# Patient Record
Sex: MALE | Race: WHITE | NOT HISPANIC OR LATINO | Employment: FULL TIME | URBAN - METROPOLITAN AREA
[De-identification: names, ages, dates, MRNs, and addresses within clinical notes are randomized per-mention and may not be internally consistent; named-entity substitution may affect disease eponyms.]

---

## 2023-04-05 ENCOUNTER — HOSPITAL ENCOUNTER (OUTPATIENT)
Dept: DATA CONVERSION | Facility: HOSPITAL | Age: 63
End: 2023-04-05
Attending: PHYSICAL MEDICINE & REHABILITATION | Admitting: PHYSICAL MEDICINE & REHABILITATION
Payer: COMMERCIAL

## 2023-04-05 DIAGNOSIS — M46.1 SACROILIITIS, NOT ELSEWHERE CLASSIFIED (CMS-HCC): ICD-10-CM

## 2023-04-19 ENCOUNTER — HOSPITAL ENCOUNTER (OUTPATIENT)
Dept: DATA CONVERSION | Facility: HOSPITAL | Age: 63
End: 2023-04-19
Attending: PHYSICAL MEDICINE & REHABILITATION | Admitting: PHYSICAL MEDICINE & REHABILITATION
Payer: COMMERCIAL

## 2023-04-19 DIAGNOSIS — M46.1 SACROILIITIS, NOT ELSEWHERE CLASSIFIED (CMS-HCC): ICD-10-CM

## 2023-09-07 VITALS — BODY MASS INDEX: 33.19 KG/M2 | WEIGHT: 250.44 LBS | HEIGHT: 73 IN

## 2023-09-14 NOTE — H&P
History & Physical Reviewed:   I have reviewed the History and Physical dated:  05-Apr-2023   History and Physical reviewed and relevant findings noted. Patient examined to review pertinent physical  findings.: No significant changes   Home Medications Reviewed: no changes noted   Allergies Reviewed: no changes noted       ERAS (Enhanced Recovery After Surgery):  ·  ERAS Patient: no     Consent:   COVID-19 Consent:  ·  COVID-19 Risk Consent Surgeon has reviewed key risks related to the risk of niraj COVID-19 and if they contract COVID-19 what the risks are.       Electronic Signatures:  Alex Roberts)  (Signed 05-Apr-2023 07:48)   Authored: History & Physical Reviewed, ERAS, Consent,  Note Completion      Last Updated: 05-Apr-2023 07:48 by Alex Roberts)

## 2023-09-14 NOTE — H&P
History & Physical Reviewed:   I have reviewed the History and Physical dated:  19-Apr-2023   History and Physical reviewed and relevant findings noted. Patient examined to review pertinent physical  findings.: No significant changes   Home Medications Reviewed: no changes noted   Allergies Reviewed: no changes noted       ERAS (Enhanced Recovery After Surgery):  ·  ERAS Patient: no     Consent:   COVID-19 Consent:  ·  COVID-19 Risk Consent Surgeon has reviewed key risks related to the risk of niraj COVID-19 and if they contract COVID-19 what the risks are.       Electronic Signatures:  Alex Roberts)  (Signed 19-Apr-2023 07:59)   Authored: History & Physical Reviewed, ERAS, Consent,  Note Completion      Last Updated: 19-Apr-2023 07:59 by Alex Roberts)

## 2023-09-27 ENCOUNTER — HOSPITAL ENCOUNTER (OUTPATIENT)
Dept: DATA CONVERSION | Facility: HOSPITAL | Age: 63
End: 2023-09-27
Attending: PHYSICAL MEDICINE & REHABILITATION | Admitting: PHYSICAL MEDICINE & REHABILITATION
Payer: COMMERCIAL

## 2023-09-27 DIAGNOSIS — M46.1 SACROILIITIS, NOT ELSEWHERE CLASSIFIED (CMS-HCC): ICD-10-CM

## 2023-09-29 VITALS — BODY MASS INDEX: 33.19 KG/M2 | HEIGHT: 73 IN | WEIGHT: 250.44 LBS

## 2023-09-30 NOTE — H&P
History & Physical Reviewed:   I have reviewed the History and Physical dated:  27-Sep-2023   History and Physical reviewed and relevant findings noted. Patient examined to review pertinent physical  findings.: No significant changes   Home Medications Reviewed: no changes noted   Allergies Reviewed: no changes noted       ERAS (Enhanced Recovery After Surgery):  ·  ERAS Patient: no     Consent:   COVID-19 Consent:  ·  COVID-19 Risk Consent Surgeon has reviewed key risks related to the risk of niraj COVID-19 and if they contract COVID-19 what the risks are.       Electronic Signatures:  Alex Roberts)  (Signed 27-Sep-2023 08:38)   Authored: History & Physical Reviewed, ERAS, Consent,  Note Completion      Last Updated: 27-Sep-2023 08:38 by Alex Roberts)

## 2023-10-04 DIAGNOSIS — M46.1 SACROILIAC INFLAMMATION (CMS-HCC): Primary | ICD-10-CM

## 2023-10-17 PROBLEM — S33.30XA: Status: ACTIVE | Noted: 2023-10-17

## 2023-10-17 PROBLEM — M43.27: Status: ACTIVE | Noted: 2023-10-17

## 2023-10-17 PROBLEM — N52.2 DRUG-INDUCED ERECTILE DYSFUNCTION: Status: ACTIVE | Noted: 2023-10-17

## 2023-10-17 PROBLEM — F41.9 ANXIETY: Status: ACTIVE | Noted: 2023-10-17

## 2023-10-17 PROBLEM — S31.000A: Status: ACTIVE | Noted: 2023-10-17

## 2023-10-17 PROBLEM — S46.019A: Status: ACTIVE | Noted: 2023-10-17

## 2023-10-17 PROBLEM — G47.00 INSOMNIA: Status: ACTIVE | Noted: 2023-10-17

## 2023-10-17 PROBLEM — N52.9 ERECTILE DYSFUNCTION: Status: ACTIVE | Noted: 2023-10-17

## 2023-10-17 PROBLEM — M46.1 BILATERAL SACROILIITIS (CMS-HCC): Status: ACTIVE | Noted: 2023-10-17

## 2023-10-17 PROBLEM — S32.10XA CLOSED FRACTURE OF SACRUM (MULTI): Status: ACTIVE | Noted: 2023-10-17

## 2023-10-17 PROBLEM — S22.49XA CLOSED FRACTURE OF FOUR RIBS: Status: ACTIVE | Noted: 2023-10-17

## 2023-10-17 PROBLEM — K59.03 DRUG-INDUCED CONSTIPATION: Status: ACTIVE | Noted: 2023-10-17

## 2023-10-17 RX ORDER — NALOXONE HYDROCHLORIDE 4 MG/.1ML
4 SPRAY NASAL AS NEEDED
COMMUNITY
Start: 2023-02-08

## 2023-10-17 RX ORDER — NABUMETONE 500 MG/1
500 TABLET, FILM COATED ORAL 2 TIMES DAILY
COMMUNITY
End: 2023-10-26 | Stop reason: SDUPTHER

## 2023-10-17 RX ORDER — PEN NEEDLE, DIABETIC 29 G X1/2"
NEEDLE, DISPOSABLE MISCELLANEOUS
COMMUNITY
Start: 2023-01-17

## 2023-10-17 RX ORDER — HYDROCODONE BITARTRATE AND ACETAMINOPHEN 5; 325 MG/1; MG/1
1 TABLET ORAL EVERY 4 HOURS PRN
COMMUNITY
Start: 2015-10-07 | End: 2023-10-26 | Stop reason: SDUPTHER

## 2023-10-17 RX ORDER — TRAZODONE HYDROCHLORIDE 50 MG/1
1 TABLET ORAL NIGHTLY
COMMUNITY
Start: 2022-05-13 | End: 2023-10-26 | Stop reason: SDUPTHER

## 2023-10-17 RX ORDER — PEN NEEDLE, DIABETIC 32 GX 1/4"
NEEDLE, DISPOSABLE MISCELLANEOUS
COMMUNITY
Start: 2023-08-18

## 2023-10-17 RX ORDER — VARDENAFIL HYDROCHLORIDE 20 MG/1
20 TABLET ORAL DAILY PRN
COMMUNITY
Start: 2019-05-17

## 2023-10-17 RX ORDER — LUBIPROSTONE 24 UG/1
24 CAPSULE, GELATIN COATED ORAL 2 TIMES DAILY
COMMUNITY
Start: 2015-10-07 | End: 2023-10-26 | Stop reason: SDUPTHER

## 2023-10-17 RX ORDER — INSULIN GLARGINE 100 [IU]/ML
INJECTION, SOLUTION SUBCUTANEOUS
COMMUNITY
Start: 2023-05-19

## 2023-10-17 RX ORDER — METFORMIN HYDROCHLORIDE 1000 MG/1
TABLET ORAL
COMMUNITY
Start: 2023-08-18

## 2023-10-17 RX ORDER — ROSUVASTATIN CALCIUM 20 MG/1
TABLET, COATED ORAL
COMMUNITY
Start: 2023-08-10

## 2023-10-17 RX ORDER — HYDROMORPHONE HYDROCHLORIDE 12 MG/1
1 TABLET, EXTENDED RELEASE ORAL DAILY
COMMUNITY
End: 2023-10-26 | Stop reason: SDUPTHER

## 2023-10-17 RX ORDER — FLASH GLUCOSE SENSOR
KIT MISCELLANEOUS
COMMUNITY
Start: 2023-06-19

## 2023-10-18 ENCOUNTER — ANCILLARY PROCEDURE (OUTPATIENT)
Dept: RADIOLOGY | Facility: CLINIC | Age: 63
End: 2023-10-18
Payer: COMMERCIAL

## 2023-10-18 ENCOUNTER — HOSPITAL ENCOUNTER (OUTPATIENT)
Dept: OPERATING ROOM | Facility: CLINIC | Age: 63
Discharge: HOME | End: 2023-10-18
Payer: COMMERCIAL

## 2023-10-18 VITALS
TEMPERATURE: 97.2 F | BODY MASS INDEX: 34.77 KG/M2 | RESPIRATION RATE: 16 BRPM | HEART RATE: 71 BPM | HEIGHT: 73 IN | DIASTOLIC BLOOD PRESSURE: 71 MMHG | SYSTOLIC BLOOD PRESSURE: 148 MMHG | WEIGHT: 262.35 LBS | OXYGEN SATURATION: 94 %

## 2023-10-18 DIAGNOSIS — M46.1 BILATERAL SACROILIITIS (CMS-HCC): Primary | ICD-10-CM

## 2023-10-18 LAB — GLUCOSE BLD MANUAL STRIP-MCNC: 178 MG/DL (ref 74–99)

## 2023-10-18 PROCEDURE — 96372 THER/PROPH/DIAG INJ SC/IM: CPT | Performed by: PHYSICAL MEDICINE & REHABILITATION

## 2023-10-18 PROCEDURE — 2500000005 HC RX 250 GENERAL PHARMACY W/O HCPCS: Performed by: PHYSICAL MEDICINE & REHABILITATION

## 2023-10-18 PROCEDURE — 2550000001 HC RX 255 CONTRASTS: Performed by: PHYSICAL MEDICINE & REHABILITATION

## 2023-10-18 PROCEDURE — 7100000010 HC PHASE TWO TIME - EACH INCREMENTAL 1 MINUTE

## 2023-10-18 PROCEDURE — 82947 ASSAY GLUCOSE BLOOD QUANT: CPT

## 2023-10-18 PROCEDURE — 7100000009 HC PHASE TWO TIME - INITIAL BASE CHARGE

## 2023-10-18 PROCEDURE — 2500000004 HC RX 250 GENERAL PHARMACY W/ HCPCS (ALT 636 FOR OP/ED): Performed by: PHYSICAL MEDICINE & REHABILITATION

## 2023-10-18 PROCEDURE — 77003 FLUOROGUIDE FOR SPINE INJECT: CPT

## 2023-10-18 PROCEDURE — 27096 INJECT SACROILIAC JOINT: CPT | Performed by: PHYSICAL MEDICINE & REHABILITATION

## 2023-10-18 RX ORDER — LIDOCAINE HYDROCHLORIDE 5 MG/ML
INJECTION, SOLUTION INFILTRATION; PERINEURAL AS NEEDED
Status: COMPLETED | OUTPATIENT
Start: 2023-10-18 | End: 2023-10-18

## 2023-10-18 RX ORDER — DEXAMETHASONE SODIUM PHOSPHATE 100 MG/10ML
INJECTION INTRAMUSCULAR; INTRAVENOUS AS NEEDED
Status: COMPLETED | OUTPATIENT
Start: 2023-10-18 | End: 2023-10-18

## 2023-10-18 RX ORDER — FENTANYL CITRATE 50 UG/ML
INJECTION, SOLUTION INTRAMUSCULAR; INTRAVENOUS AS NEEDED
Status: COMPLETED | OUTPATIENT
Start: 2023-10-18 | End: 2023-10-18

## 2023-10-18 RX ORDER — LIDOCAINE HYDROCHLORIDE 5 MG/ML
INJECTION, SOLUTION INFILTRATION; PERINEURAL AS NEEDED
Status: DISCONTINUED | OUTPATIENT
Start: 2023-10-18 | End: 2023-10-20 | Stop reason: HOSPADM

## 2023-10-18 RX ADMIN — LIDOCAINE HYDROCHLORIDE 5 ML: 5 INJECTION, SOLUTION INFILTRATION; PERINEURAL at 08:33

## 2023-10-18 RX ADMIN — FENTANYL CITRATE 100 MCG: 50 INJECTION, SOLUTION INTRAMUSCULAR; INTRAVENOUS at 09:14

## 2023-10-18 RX ADMIN — DEXAMETHASONE SODIUM PHOSPHATE 10 MG: 10 INJECTION INTRAMUSCULAR; INTRAVENOUS at 09:17

## 2023-10-18 RX ADMIN — LIDOCAINE HYDROCHLORIDE 5 ML: 5 INJECTION, SOLUTION INFILTRATION; PERINEURAL at 09:17

## 2023-10-18 RX ADMIN — IOHEXOL 2 ML: 240 INJECTION, SOLUTION INTRATHECAL; INTRAVASCULAR; INTRAVENOUS; ORAL at 09:17

## 2023-10-18 NOTE — OP NOTE
* No procedures listed * Operative Note     Date: 10/18/2023  [place] [facility] [place] [id number] [place]    Name: Jared Thomson, : 1960, Age: 63 y.o., MRN: [id number], Sex: male    Diagnosis  Pre-op Diagnosis     * Sacroiliac inflammation (CMS/HCC) [M46.1] Same     Time-in: 9:00 am   time-out: 9:18 am     Sedation: 100 mcg of IV fentanyl     Side:  bilateral    Indication: Failure to respond to conservative treatment with therapy and medications.     PROCEDURE:    The risks, benefits and alternatives of the procedure were discussed with the patient and agreed to proceed. The risks included but not limited to: infection, bleeding, paralysis, nerve injury sepsis and remotely death were discussed with the patient during the office and again in the pre procedure area.  The patient signed informed consent in the pre procedure area.     The patient was brought to procedure room and time out for the procedure was performed with the procedure room staff present.    Patient placed in prone position on the procedure table and draped and covered appropriately.      Fluoroscopy machine was used to identify the lumbo sacral area over the  right SIJ    Skin was prepped and draped in sterile fashion over the SIJ area.  Skin was infiltrated with local anesthetic with lidocaien 0.5%    Spinal needle was to the lower end of the SIJ, tip of the needle position was verified with adequate flow of contrast dye 0.5cc of Isovue in the joint space.  At that point, 10 mg dexamethasone mixed with 1 mL of lidocaine 0.5% were injected.      Same procedure repeated on the left SIJ with 10 mg of Dexamethasone mixed with 1 cc of lidocaine 0.5 % in the left SIJ      Procedure tolerated very well.  No complications encountered.  Post procedure care discussed with the patient, agreed to proceed.   Patient instructed on keeping track of the pain level after discharge.   Patient discharged home, from the recovery room, in stable  condition.    Attending Attestation: I performed the procedure.    Alex Roberts MD

## 2023-10-18 NOTE — H&P
Mr. Thomson is back today for the injection.  He continues to have the pain in the sacroiliac joint on the left side.  This is a plan to do the procedure.  There is no change in his history and physical examination since the last office visit last month

## 2023-10-18 NOTE — INTERVAL H&P NOTE
H&P reviewed. The patient was examined and there are no changes to the H&P.  Denied any fever or chills. No weight loss and no night sweats. No cough or sputum production. No diarrhea   The constipation has been responding to fibers and over the counter medications.     No bladder and bowel incontinence and no other changes in bladder and bowel. No skin changes.  Reports tiredness and fatigability only if the pain is not controlled.   Denied opioids diversion and abuse and denies alcoholism. Denies overuse of the pain medications.

## 2023-10-26 ENCOUNTER — OFFICE VISIT (OUTPATIENT)
Dept: PAIN MEDICINE | Facility: CLINIC | Age: 63
End: 2023-10-26
Payer: COMMERCIAL

## 2023-10-26 DIAGNOSIS — S22.41XA CLOSED FRACTURE OF FOUR RIBS OF RIGHT SIDE, INITIAL ENCOUNTER: ICD-10-CM

## 2023-10-26 DIAGNOSIS — M43.27 ANKYLOSIS OF LUMBOSACRAL JOINT: ICD-10-CM

## 2023-10-26 DIAGNOSIS — M46.1 BILATERAL SACROILIITIS (CMS-HCC): Primary | ICD-10-CM

## 2023-10-26 DIAGNOSIS — S32.10XA CLOSED FRACTURE OF SACRUM, UNSPECIFIED PORTION OF SACRUM, INITIAL ENCOUNTER (MULTI): ICD-10-CM

## 2023-10-26 DIAGNOSIS — S46.011A RUPTURE OF RIGHT SUPRASPINATUS TENDON, INITIAL ENCOUNTER: ICD-10-CM

## 2023-10-26 PROCEDURE — 99214 OFFICE O/P EST MOD 30 MIN: CPT | Performed by: PHYSICAL MEDICINE & REHABILITATION

## 2023-10-26 RX ORDER — HYDROMORPHONE HYDROCHLORIDE 12 MG/1
1 TABLET, EXTENDED RELEASE ORAL DAILY
Qty: 28 TABLET | Refills: 0 | Status: SHIPPED | OUTPATIENT
Start: 2023-11-29 | End: 2023-12-21 | Stop reason: SDUPTHER

## 2023-10-26 RX ORDER — HYDROMORPHONE HYDROCHLORIDE 12 MG/1
1 TABLET, EXTENDED RELEASE ORAL DAILY
Qty: 28 TABLET | Refills: 0 | Status: SHIPPED | OUTPATIENT
Start: 2023-11-01 | End: 2023-11-29

## 2023-10-26 RX ORDER — HYDROCODONE BITARTRATE AND ACETAMINOPHEN 5; 325 MG/1; MG/1
1 TABLET ORAL EVERY 8 HOURS PRN
Qty: 84 TABLET | Refills: 0 | Status: SHIPPED | OUTPATIENT
Start: 2023-11-29 | End: 2023-10-26 | Stop reason: SDUPTHER

## 2023-10-26 RX ORDER — HYDROCODONE BITARTRATE AND ACETAMINOPHEN 5; 325 MG/1; MG/1
1 TABLET ORAL EVERY 8 HOURS PRN
Qty: 84 TABLET | Refills: 0 | Status: SHIPPED | OUTPATIENT
Start: 2023-11-29 | End: 2023-12-21 | Stop reason: SDUPTHER

## 2023-10-26 RX ORDER — TRAZODONE HYDROCHLORIDE 50 MG/1
50 TABLET ORAL NIGHTLY
Qty: 28 TABLET | Refills: 1 | Status: SHIPPED | OUTPATIENT
Start: 2023-10-26 | End: 2023-12-21 | Stop reason: SDUPTHER

## 2023-10-26 RX ORDER — HYDROMORPHONE HYDROCHLORIDE 12 MG/1
1 TABLET, EXTENDED RELEASE ORAL DAILY
Qty: 28 TABLET | Refills: 0 | Status: SHIPPED | OUTPATIENT
Start: 2023-11-29 | End: 2023-10-26 | Stop reason: SDUPTHER

## 2023-10-26 RX ORDER — LUBIPROSTONE 24 UG/1
24 CAPSULE, GELATIN COATED ORAL 2 TIMES DAILY
Qty: 56 CAPSULE | Refills: 1 | Status: SHIPPED | OUTPATIENT
Start: 2023-10-26 | End: 2023-12-21 | Stop reason: SDUPTHER

## 2023-10-26 RX ORDER — NABUMETONE 500 MG/1
500 TABLET, FILM COATED ORAL 2 TIMES DAILY
Qty: 56 TABLET | Refills: 1 | Status: SHIPPED | OUTPATIENT
Start: 2023-10-26 | End: 2023-11-23

## 2023-10-26 RX ORDER — HYDROMORPHONE HYDROCHLORIDE 12 MG/1
1 TABLET, EXTENDED RELEASE ORAL DAILY
Qty: 28 TABLET | Refills: 0 | Status: SHIPPED | OUTPATIENT
Start: 2023-11-01 | End: 2023-10-26 | Stop reason: SDUPTHER

## 2023-10-26 RX ORDER — HYDROCODONE BITARTRATE AND ACETAMINOPHEN 5; 325 MG/1; MG/1
1 TABLET ORAL EVERY 8 HOURS PRN
Qty: 84 TABLET | Refills: 0 | Status: SHIPPED | OUTPATIENT
Start: 2023-11-01 | End: 2023-11-29

## 2023-10-26 RX ORDER — HYDROCODONE BITARTRATE AND ACETAMINOPHEN 5; 325 MG/1; MG/1
1 TABLET ORAL EVERY 8 HOURS PRN
Qty: 84 TABLET | Refills: 0 | Status: SHIPPED | OUTPATIENT
Start: 2023-11-01 | End: 2023-10-26 | Stop reason: SDUPTHER

## 2023-10-26 RX ORDER — TRAZODONE HYDROCHLORIDE 50 MG/1
50 TABLET ORAL NIGHTLY
Qty: 28 TABLET | Refills: 1 | Status: SHIPPED | OUTPATIENT
Start: 2023-10-26 | End: 2023-10-26 | Stop reason: SDUPTHER

## 2023-10-26 RX ORDER — LUBIPROSTONE 24 UG/1
24 CAPSULE, GELATIN COATED ORAL 2 TIMES DAILY
Qty: 56 CAPSULE | Refills: 1 | Status: SHIPPED | OUTPATIENT
Start: 2023-10-26 | End: 2023-10-26 | Stop reason: SDUPTHER

## 2023-10-26 RX ORDER — NABUMETONE 500 MG/1
500 TABLET, FILM COATED ORAL 2 TIMES DAILY
Qty: 56 TABLET | Refills: 1 | Status: SHIPPED | OUTPATIENT
Start: 2023-10-26 | End: 2023-10-26 | Stop reason: SDUPTHER

## 2023-10-28 NOTE — PROGRESS NOTES
[facility] [place] [id number]  DOI 1.18.2007     · Rupture of supraspinatus tendon (840.4) (S46.019A)   · Open traumatic subluxation of pelvis, initial encounter (839.79) (S33.30XA,S31.000A)   · Bilateral sacroiliitis (720.2) (M46.1)   · Ankylosis of lumbosacral joint (724.6) (M43.27)    Chief complaint  Back pain over the SIJ    History  Mr Thomson returned today for follow up office visit .  The SIJ injection helped with the pain control. The pain is better controlled with the pain meds at this time but it is not gone. the pain is on the either side of midline . and the pain is mechanical in characteristics , continuous and gets worse with movements mainly with forward flexion and bending. he has the symptoms bilaterally   he reported no radicular symptoms to the lower limbs       reported no bowel and bladder incontinence      he is using the pain medications and those are taking the edge of the pain.     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 Denied euphoria associated with the use of the pain medications.      opioids treatment agreement February 2023   Oarrs pulled and scanned in the chart ORS is 160 and Narx is 472   last urine toxicology testing September 2022 and April 2023 nd that was compatible.    Xray updated lumbar spine   ORT Score is 0  Pain pathology and pain generators lumbar spine and shoulder and sacro iliac joint   Modalities tried surgery Physical Therapy and acupuncture and home exercises program and intra articular steroid injection      Denied any fever or chills. No weight loss and no night sweats. No cough or sputum production. No diarrhea   The constipation has been responding to fibers and over the counter medications.     No bladder and bowel incontinence and no other changes in bladder and bowel. No skin  changes.  Reports tiredness and fatigability only if the pain is not controlled.   Denied opioids diversion and abuse and denies alcoholism. Denies overuse of the pain medications.      Physical examination  declined Chaperone for the visit and was appropriately draped for the exam.      Awake, Alert and oriented to time, place and persons. The speech is coherent and well understood.   no acute distress   The pupils are equal, midsize, reactive to light and accommodation.      widened perimeter of stance      Negative loading of the sacroiliac joint bilaterally with positive Jesse and Compression testing and distraction test. Gaenslen is positive on both side     he has negative straight leg raising bilaterally .      pain inhibition of the hips Manual Muscle strength testing because of the pain in the lower back . The endurance was decreased even though the initial resistance was 5/5 but could not keep beyond initial resistance.      limited range of motion of the lumbar spine because of the stiffness      nares are clear . no iv tracks . skin in intact in the feet. cap refill of 2 second and palpable pedal pulses        Diagnosis  Problem List Items Addressed This Visit       Ankylosis of lumbosacral joint    Relevant Medications    Amitiza 24 mcg capsule    HYDROcodone-acetaminophen (Norco) 5-325 mg tablet (Start on 11/29/2023)    HYDROcodone-acetaminophen (Norco) 5-325 mg tablet (Start on 11/1/2023)    HYDROmorphone 12 mg tablet extended release 24 hr (Start on 11/29/2023)    HYDROmorphone 12 mg tablet extended release 24 hr (Start on 11/1/2023)    nabumetone (Relafen) 500 mg tablet    traZODone (Desyrel) 50 mg tablet    Bilateral sacroiliitis (CMS/HCC) - Primary    Relevant Medications    Amitiza 24 mcg capsule    HYDROcodone-acetaminophen (Norco) 5-325 mg tablet (Start on 11/29/2023)    HYDROcodone-acetaminophen (Norco) 5-325 mg tablet (Start on 11/1/2023)    HYDROmorphone 12 mg tablet extended release 24 hr  (Start on 11/29/2023)    HYDROmorphone 12 mg tablet extended release 24 hr (Start on 11/1/2023)    nabumetone (Relafen) 500 mg tablet    traZODone (Desyrel) 50 mg tablet    Closed fracture of four ribs    Closed fracture of sacrum (CMS/HCC)    Rupture of supraspinatus tendon        Plan  reviewed the pain generators in the pelvis and sacro iliac joint and shoulder      Discussed the mechanism of action of the acupuncture and injection and Physical Therapy      Went over the pain medications and mechanism of action and side effects  Home exercises program      .renew pain medications with long acting formulation hydromorphone and short acting hydrocodone  he has the Opioids induced constipation (K59.03) and will try him on movantik since the amitiza is not helping now and the over the counter did not help      I recommended cutting back on the pain medications. I explained my rationale as before. Previous trials on cutting back on the medications led to increase pain. We are going to keep trying and encouraging a decrease on the dosing of the pain medications at every visit.     We discussed that we are prescribing the medications on good leo and legitimate medical reason.     The level of clinical decision making in this office visit,  is high, given the high risks of complications with the morbidity and mortality due to the fact that acute and chronic pain may pose a threat to life and bodily function, if under treated, poorly treated, or with failure to maintain adequate treatment and timely medical follow up. Additionally over treatment has its own set of complications including overdosing on the pain medications and also the habit forming potentials with the use of the medications used to treat chronic painful conditions including therapeutic classes classified as dangerous medications. Given the serious and fluctuating nature of pain level and instensity with extensive consideration for whenever pain changes,  there is always the risk of prolonged functional impairment requiring close patient monitoring with regular assessments and reassessments and high level medical decision making at every office visit. The amount and complexity of data reviewed is high given the patient clinical presentation, labs,  data, radiology reports, and other tests as discussed during office visits. Pertinent data whether positive or negative were taken in consideration in the process of making this high level medical decision.        Follow-up 8 weeks or earlier if needed

## 2023-12-21 ENCOUNTER — OFFICE VISIT (OUTPATIENT)
Dept: PAIN MEDICINE | Facility: CLINIC | Age: 63
End: 2023-12-21
Payer: COMMERCIAL

## 2023-12-21 DIAGNOSIS — M43.27 ANKYLOSIS OF LUMBOSACRAL JOINT: Primary | ICD-10-CM

## 2023-12-21 DIAGNOSIS — S31.000A OPEN TRAUMATIC SUBLUXATION OF PELVIS, INITIAL ENCOUNTER: ICD-10-CM

## 2023-12-21 DIAGNOSIS — S33.30XA OPEN TRAUMATIC SUBLUXATION OF PELVIS, INITIAL ENCOUNTER: ICD-10-CM

## 2023-12-21 DIAGNOSIS — M46.1 BILATERAL SACROILIITIS (CMS-HCC): ICD-10-CM

## 2023-12-21 DIAGNOSIS — S32.10XA CLOSED FRACTURE OF SACRUM, UNSPECIFIED PORTION OF SACRUM, INITIAL ENCOUNTER (MULTI): ICD-10-CM

## 2023-12-21 DIAGNOSIS — S46.011A RUPTURE OF RIGHT SUPRASPINATUS TENDON, INITIAL ENCOUNTER: ICD-10-CM

## 2023-12-21 PROCEDURE — 99214 OFFICE O/P EST MOD 30 MIN: CPT | Performed by: PHYSICAL MEDICINE & REHABILITATION

## 2023-12-21 RX ORDER — TRAZODONE HYDROCHLORIDE 50 MG/1
50 TABLET ORAL NIGHTLY
Qty: 28 TABLET | Refills: 0 | Status: SHIPPED | OUTPATIENT
Start: 2023-12-21 | End: 2024-01-23 | Stop reason: SDUPTHER

## 2023-12-21 RX ORDER — LUBIPROSTONE 24 UG/1
24 CAPSULE, GELATIN COATED ORAL 2 TIMES DAILY
Qty: 56 CAPSULE | Refills: 0 | Status: SHIPPED | OUTPATIENT
Start: 2023-12-21 | End: 2024-01-23 | Stop reason: SDUPTHER

## 2023-12-21 RX ORDER — HYDROCODONE BITARTRATE AND ACETAMINOPHEN 5; 325 MG/1; MG/1
1 TABLET ORAL EVERY 8 HOURS PRN
Qty: 84 TABLET | Refills: 0 | Status: SHIPPED | OUTPATIENT
Start: 2023-12-28 | End: 2024-01-23 | Stop reason: SDUPTHER

## 2023-12-21 RX ORDER — HYDROMORPHONE HYDROCHLORIDE 12 MG/1
1 TABLET, EXTENDED RELEASE ORAL DAILY
Qty: 28 TABLET | Refills: 0 | Status: SHIPPED | OUTPATIENT
Start: 2023-12-28 | End: 2024-01-23 | Stop reason: SDUPTHER

## 2023-12-21 RX ORDER — NABUMETONE 500 MG/1
500 TABLET, FILM COATED ORAL 2 TIMES DAILY
Qty: 60 TABLET | Refills: 0 | Status: SHIPPED | OUTPATIENT
Start: 2023-12-21 | End: 2024-01-23 | Stop reason: SDUPTHER

## 2023-12-21 NOTE — PROGRESS NOTES
[facility] [id number]  DOI 1.18.2007      · Rupture of supraspinatus tendon (840.4) (S46.019A)   · Open traumatic subluxation of pelvis, initial encounter (839.79) (S33.30XA,S31.000A)   · Bilateral sacroiliitis (720.2) (M46.1)   · Ankylosis of lumbosacral joint (724.6) (M43.27)      Chief complaint  Back pain   R shoulder pain     History  Mr Thomson is back for visit  The lower back is worse on the right but it is bilateral  The pain in the back is deep on the right  side.  The pain is below the belt line, it is continuous but it gets worse with extension of the lumbar spine and with leading for the right leg.  It improves with leaning forward.  Sitting up increases the pain. Improves with laying on the side. There are no reported radiation of the pain to the lower limbs.     No bowel or bladder incontinence.  No sensory or motor changes in the lower limbs.    No changes or in the color or textures of the skin of the lower limbs.    Also The pain in the righ shoulder is deep and stabbing.  It is associated with sharp sensation inside the shoulder joint mainly with movements.  The pain is continuous at rest and it gets worse with reaching forward and overhead.  Also reaching outward increases the pain.  There is no radiation of the pain to the upper limb.  The pain is associated with tight muscle bands around the shoulder blade.  These gets worse with shoulder stabilization like working at shoulder level or even doing any activity with the upper limb when it is not supported.  There is occasional sensation of fine cracking inside the shoulder joint when the shoulder position is changed rapidly.  Also occasionally there is a sensation of fullness inside the shoulder joint.  The right shoulder catches when it reaches at shoulder level.  With maneuvering and external rotation the shoulder can go up few more degrees.  The right shoulder gets tired quickly with any activity.       Pain level without medication is 8/10 ,  with the medication pain level 4/10.     The pain meds are helping control the pain and improving Activities of Daily living and quality of life and quality of sleep.    opioids treatment agreement 2023  Oarrs pulled and scanned in the chart  no concerns  last urine toxicology testing earlier this year and it was compliant we will repeat  Xray updated spine   ORT Score is  0  Pain pathology and pain generators spine and shoudler   Modalities tried injection, surgery, physical therapy, TENS unit, nonsteroidal anti-inflammatory medication  fusion      Denied any fever or chills. No weight loss and no night sweats. No cough or sputum production. No diarrhea   The constipation has been responding to fibers and over the counter medications.     No bladder and bowel incontinence and no other changes in bladder and bowel. No skin changes.  Reports tiredness and fatigability only if the pain is not controlled.   Denied opioids diversion and abuse and denies alcoholism. Denies overuse of the pain medications.    The control of the pain with the pain medications is helping the control of the symptoms and allowing the function and activities of daily living, enjoyment of life, improving the quality of life and sleep with less interruption by the pain. The goal is symptomatic control of the nonmalignant chronic pain and not to repair the permanent damage in the tissues inducing the chronic pain conditions. We are aiming to shift the focus from the nonmalignant chronic pain to other aspects of life by symptomatically treating this chronic pain. If this pain is not treated it will lead to major morbidity and it is also associated with increased risks of mortality. The patient understands those very clearly and also understand high risks of morbidity and mortality if not strictly adherent to the treatment recommendations and reporting any associated side effects. Also patient understand the full responsibility associated with  these medications to avoid abuse or overuse or any use of these medications for anything besides treating the patient's own chronic pain and nothing else under any circumstances.        Physical examination  Awake, alert and oriented for time place and persons   declined Chaperone for the visit and was adequately  draped for the exam.      Examination of the lumbar spine and the SIJ area showed mild reversal of the lumbar lordosis with slight scoliosis with right-sided concavity.  The scoliosis slightly corrected upon bending of the lumbar spine.  Tight muscle bands over the right  lower lumbar area and over the upper buttock area.  Gaenslen and Peter are positive on the right side. Also compression test of the right SIJ is positive increasing the pain.   Cardona test negative with no pain upon provocative testing of the lower facet joints  Straight leg raising was negative on both sides.  No sensorimotor deficits in the lower limbs.  Abdirahman testing were negative for axial loading and log rotation.  No aberrant pain behavior.      The right shoulder physical examination showed mild atrophy of the right supraspinatus compared to the left side.  Impingement at 90 degrees in forward flexion and abduction.  This improved by few degrees upon external rotation.  Tight muscle bands and trigger points around the shoulder blade muscles.  No overt shoulder instability but tenderness on palpation of the subacromial area.  Negative cervical root tension sign.  Right shoulder strength is 4/5 in all directions.  No distal sensorimotor deficits associated with the right shoulder.     Diagnosis  Problem List Items Addressed This Visit       Ankylosis of lumbosacral joint - Primary    Relevant Medications    HYDROcodone-acetaminophen (Norco) 5-325 mg tablet (Start on 12/28/2023)    HYDROmorphone 12 mg tablet extended release 24 hr (Start on 12/28/2023)    Amitiza 24 mcg capsule    traZODone (Desyrel) 50 mg tablet    nabumetone  (Relafen) 500 mg tablet    Bilateral sacroiliitis (CMS/HCC)    Relevant Medications    HYDROcodone-acetaminophen (Norco) 5-325 mg tablet (Start on 12/28/2023)    HYDROmorphone 12 mg tablet extended release 24 hr (Start on 12/28/2023)    Amitiza 24 mcg capsule    traZODone (Desyrel) 50 mg tablet    nabumetone (Relafen) 500 mg tablet    Closed fracture of sacrum (CMS/HCC)    Relevant Medications    Amitiza 24 mcg capsule    traZODone (Desyrel) 50 mg tablet    nabumetone (Relafen) 500 mg tablet    Open traumatic subluxation of pelvis    Relevant Medications    Amitiza 24 mcg capsule    traZODone (Desyrel) 50 mg tablet    nabumetone (Relafen) 500 mg tablet    Rupture of supraspinatus tendon    Relevant Medications    Amitiza 24 mcg capsule    traZODone (Desyrel) 50 mg tablet    nabumetone (Relafen) 500 mg tablet        Plan  Reviewed the pain generators.  Went over the types of pain with neuropathic and nociceptive and different pathologies and therapeutic modalities. Discussed the mechanism of action of interventions from acupuncture, physical therapy , regular exercises, injections, botox, spinal cord stimulation, and role of surgery     Went over pathology of the intervertebral disc displacement and the anatomical relation to the Nerve roots and relation to the radicular symptoms. Went over treatment modalities with conservative treatment including acupuncture   and epidural steroid injection with fluoroscopy guidance and last resort of surgery    Based on the above findings and the clinical response to the opioids medications and improvement of the activities of daily living, sleep, and work performance. We made this complex decision to continue the opioids therapy in light of the evidence of the patient's responsibility in using the pain medications as prescribed for the nonmalignant chronic pain condition. We discussed about the use of the pain medications to treat the symptoms of chronic nonmalignant pain and we  are not trying the repair the permanent damage in the tissues, rather we are trying to control the symptoms induced by the permanent damage to the tissues inducing the chronic pain condition and resulting disability. I explained the difference and discussed it with the patient and stressed the importance of knowing the difference especially because of the potential side effects and the potential addicting effect and habit forming nature of the dangerous drugs we are using to treat the symptoms of the chronic pain.      We discussed that we are prescribing the medications on good leo and legitimate medical reason.     We reviewed the side effects and precautions of opioids prescriptions as discussed in the opioids treatment agreement.    realizes the interaction between the therapeutic classes including the respiratory depression and potential death     Random drug testing twice in 6 months we will submit     Hydrocodone 5 TID   Hydromorphone ER 12 a day  has a narcan at home know how and when to use it if needed.     Discussed about NSAIDS and I explained about the opioids sparing effect to allow keeping the opioids dose at minimal effective dose.   I went over the potential side effects of the NSAIDS on the gastrointestinal, renal and cardiovascular systems.      I detailed the side effects from the acetaminophen in the medication and made aware of those. I also explained about the cumulative effects on the organs and mainly the liver.     Given the opioids therapy , we discussed about the risk for accidental over dose on the pain medications, either for patient or other household. I went over the mechanism of action and mode of use of the Naloxone according to the  recommendations. I will provide a prescription for a kit.     Follow-up 8 weeks or earlier if needed     The level of clinical decision making in this office visit,  is high, given the high risks of complications with the morbidity and  mortality due to the fact that acute and chronic pain may pose a threat to life and bodily function, if under treated, poorly treated, or with failure to maintain adequate treatment and timely medical follow up. Additionally over treatment has its own set of complications including overdosing on the pain medications and also the habit forming potentials with the use of the medications used to treat chronic painful conditions including therapeutic classes classified as dangerous medications. Given the serious and fluctuating nature of pain level and instensity with extensive consideration for whenever pain changes, there is always the risk of prolonged functional impairment requiring close patient monitoring with regular assessments and reassessments and high level medical decision making at every office visit. The amount and complexity of data reviewed is high given the patient clinical presentation, labs,  data, radiology reports, and other tests as discussed during office visits. Pertinent data whether positive or negative were taken in consideration in the process of making this high level medical decision.

## 2024-01-23 ENCOUNTER — OFFICE VISIT (OUTPATIENT)
Dept: PAIN MEDICINE | Facility: CLINIC | Age: 64
End: 2024-01-23
Payer: COMMERCIAL

## 2024-01-23 DIAGNOSIS — S32.10XA CLOSED FRACTURE OF SACRUM, UNSPECIFIED PORTION OF SACRUM, INITIAL ENCOUNTER (MULTI): ICD-10-CM

## 2024-01-23 DIAGNOSIS — S33.30XA OPEN TRAUMATIC SUBLUXATION OF PELVIS, INITIAL ENCOUNTER: ICD-10-CM

## 2024-01-23 DIAGNOSIS — S22.41XA CLOSED FRACTURE OF FOUR RIBS OF RIGHT SIDE, INITIAL ENCOUNTER: ICD-10-CM

## 2024-01-23 DIAGNOSIS — S46.011A RUPTURE OF RIGHT SUPRASPINATUS TENDON, INITIAL ENCOUNTER: ICD-10-CM

## 2024-01-23 DIAGNOSIS — M43.27 ANKYLOSIS OF LUMBOSACRAL JOINT: ICD-10-CM

## 2024-01-23 DIAGNOSIS — M46.1 BILATERAL SACROILIITIS (CMS-HCC): Primary | ICD-10-CM

## 2024-01-23 DIAGNOSIS — S31.000A OPEN TRAUMATIC SUBLUXATION OF PELVIS, INITIAL ENCOUNTER: ICD-10-CM

## 2024-01-23 PROCEDURE — 99214 OFFICE O/P EST MOD 30 MIN: CPT | Performed by: PHYSICAL MEDICINE & REHABILITATION

## 2024-01-23 RX ORDER — HYDROCODONE BITARTRATE AND ACETAMINOPHEN 5; 325 MG/1; MG/1
1 TABLET ORAL EVERY 8 HOURS PRN
Qty: 84 TABLET | Refills: 0 | Status: SHIPPED | OUTPATIENT
Start: 2024-01-25 | End: 2024-02-20 | Stop reason: SDUPTHER

## 2024-01-23 RX ORDER — NABUMETONE 500 MG/1
500 TABLET, FILM COATED ORAL 2 TIMES DAILY
Qty: 60 TABLET | Refills: 0 | Status: SHIPPED | OUTPATIENT
Start: 2024-01-23 | End: 2024-02-20 | Stop reason: SDUPTHER

## 2024-01-23 RX ORDER — HYDROMORPHONE HYDROCHLORIDE 12 MG/1
1 TABLET, EXTENDED RELEASE ORAL DAILY
Qty: 28 TABLET | Refills: 0 | Status: SHIPPED | OUTPATIENT
Start: 2024-01-25 | End: 2024-02-20 | Stop reason: SDUPTHER

## 2024-01-23 RX ORDER — LUBIPROSTONE 24 UG/1
24 CAPSULE, GELATIN COATED ORAL 2 TIMES DAILY
Qty: 56 CAPSULE | Refills: 0 | Status: SHIPPED | OUTPATIENT
Start: 2024-01-23 | End: 2024-02-20 | Stop reason: SDUPTHER

## 2024-01-23 RX ORDER — TRAZODONE HYDROCHLORIDE 50 MG/1
50 TABLET ORAL NIGHTLY
Qty: 28 TABLET | Refills: 0 | Status: SHIPPED | OUTPATIENT
Start: 2024-01-23 | End: 2024-02-20 | Stop reason: SDUPTHER

## 2024-01-23 NOTE — PROGRESS NOTES
[facility] [id number]  DOI 1.18.2007      · Rupture of supraspinatus tendon (840.4) (S46.019A)   · Open traumatic subluxation of pelvis, initial encounter (839.79) (S33.30XA,S31.000A)   · Bilateral sacroiliitis (720.2) (M46.1)   · Ankylosis of lumbosacral joint (724.6) (M43.27)      Chief complaint  Back pain   R shoulder pain    History  Jared Thomson is back for evaluation   Continues with pain in the back r worse.The pain in the back is deep on the right  side.  The pain is below the belt line, it is continuous but it gets worse with extension of the lumbar spine and with leading for the right leg.  It improves with leaning forward.  Sitting up increases the pain. Improves with laying on the side. There are no reported radiation of the pain to the lower limbs.     No bowel or bladder incontinence.  No sensory or motor changes in the lower limbs.    No changes or in the color or textures of the skin of the lower limbs.   Similar on the left side  The shoulde is controlled The pain in the righ shoulder is deep and stabbing.  It is associated with sharp sensation inside the shoulder joint mainly with movements.  The pain is continuous at rest and it gets worse with reaching forward and overhead.  Also reaching outward increases the pain.  There is no radiation of the pain to the upper limb.  The pain is associated with tight muscle bands around the shoulder blade.  These gets worse with shoulder stabilization like working at shoulder level or even doing any activity with the upper limb when it is not supported.  There is occasional sensation of fine cracking inside the shoulder joint when the shoulder position is changed rapidly.  Also occasionally there is a sensation of fullness inside the shoulder joint.  The right shoulder catches when it reaches at shoulder level.  With maneuvering and external rotation the shoulder can go up few more degrees.  The right shoulder gets tired quickly with any activity.        Pain level without medication is 8/10 , with the medication pain level 4/10.     The pain meds are helping control the pain and improving Activities of Daily living and quality of life and quality of sleep.    opioids treatment agreement Jan 2024  PDI (Pain Disability Index) score: 63  Oarrs pulled and scanned in the chart  no concerns  last urine toxicology testing earlier this year and it was compliant we will repeat  Xray updated spine   ORT Score is  spine   Pain pathology and pain generators  spine  Modalities tried injection, surgery, physical therapy, TENS unit, nonsteroidal anti-inflammatory medication fusion     Denied any fever or chills. No weight loss and no night sweats. No cough or sputum production. No diarrhea   The constipation has been responding to fibers and over the counter medications.     No bladder and bowel incontinence and no other changes in bladder and bowel. No skin changes.  Reports tiredness and fatigability only if the pain is not controlled.   Denied opioids diversion and abuse and denies alcoholism. Denies overuse of the pain medications.    The control of the pain with the pain medications is helping the control of the symptoms and allowing the function and activities of daily living, enjoyment of life, improving the quality of life and sleep with less interruption by the pain. The goal is symptomatic control of the nonmalignant chronic pain and not to repair the permanent damage in the tissues inducing the chronic pain conditions. We are aiming to shift the focus from the nonmalignant chronic pain to other aspects of life by symptomatically treating this chronic pain. If this pain is not treated it will lead to major morbidity and it is also associated with increased risks of mortality. The patient understands those very clearly and also understand high risks of morbidity and mortality if not strictly adherent to the treatment recommendations and reporting any associated  side effects. Also patient understand the full responsibility associated with these medications to avoid abuse or overuse or any use of these medications for anything besides treating the patient's own chronic pain and nothing else under any circumstances.        Physical examination  Awake, alert and oriented for time place and persons   declined Chaperone for the visit and was adequately  draped for the exam.      Examination of the lumbar spine and the SIJ area showed mild reversal of the lumbar lordosis with slight scoliosis with right-sided concavity.  The scoliosis slightly corrected upon bending of the lumbar spine.  Tight muscle bands over the right  lower lumbar area and over the upper buttock area.  Gaenslen and Peter are positive on the right side. Also compression test of the right SIJ is positive increasing the pain.   Cardona test negative with no pain upon provocative testing of the lower facet joints  Straight leg raising was negative on both sides.  No sensorimotor deficits in the lower limbs.  Abdirahman testing were negative for axial loading and log rotation.  No aberrant pain behavior.    Similar on the left     Diagnosis  Problem List Items Addressed This Visit       Ankylosis of lumbosacral joint    Relevant Medications    HYDROmorphone 12 mg tablet extended release 24 hr (Start on 1/25/2024)    HYDROcodone-acetaminophen (Norco) 5-325 mg tablet (Start on 1/25/2024)    Amitiza 24 mcg capsule    traZODone (Desyrel) 50 mg tablet    nabumetone (Relafen) 500 mg tablet    Bilateral sacroiliitis (CMS/HCC) - Primary    Relevant Medications    HYDROmorphone 12 mg tablet extended release 24 hr (Start on 1/25/2024)    HYDROcodone-acetaminophen (Norco) 5-325 mg tablet (Start on 1/25/2024)    Amitiza 24 mcg capsule    traZODone (Desyrel) 50 mg tablet    nabumetone (Relafen) 500 mg tablet    Closed fracture of four ribs    Relevant Medications    HYDROmorphone 12 mg tablet extended release 24 hr (Start on  1/25/2024)    HYDROcodone-acetaminophen (Norco) 5-325 mg tablet (Start on 1/25/2024)    Amitiza 24 mcg capsule    traZODone (Desyrel) 50 mg tablet    nabumetone (Relafen) 500 mg tablet    Closed fracture of sacrum (CMS/HCC)    Relevant Medications    HYDROmorphone 12 mg tablet extended release 24 hr (Start on 1/25/2024)    HYDROcodone-acetaminophen (Norco) 5-325 mg tablet (Start on 1/25/2024)    Amitiza 24 mcg capsule    traZODone (Desyrel) 50 mg tablet    nabumetone (Relafen) 500 mg tablet    Open traumatic subluxation of pelvis    Relevant Medications    HYDROmorphone 12 mg tablet extended release 24 hr (Start on 1/25/2024)    HYDROcodone-acetaminophen (Norco) 5-325 mg tablet (Start on 1/25/2024)    Amitiza 24 mcg capsule    traZODone (Desyrel) 50 mg tablet    nabumetone (Relafen) 500 mg tablet    Rupture of supraspinatus tendon    Relevant Medications    HYDROmorphone 12 mg tablet extended release 24 hr (Start on 1/25/2024)    HYDROcodone-acetaminophen (Norco) 5-325 mg tablet (Start on 1/25/2024)    Amitiza 24 mcg capsule    traZODone (Desyrel) 50 mg tablet    nabumetone (Relafen) 500 mg tablet        Plan  Reviewed the pain generators.  Went over the types of pain with neuropathic and nociceptive and different pathologies and therapeutic modalities. Discussed the mechanism of action of interventions from acupuncture, physical therapy , regular exercises, injections, botox, spinal cord stimulation, and role of surgery     Went over pathology of the intervertebral disc displacement and the anatomical relation to the Nerve roots and relation to the radicular symptoms. Went over treatment modalities with conservative treatment including acupuncture   and epidural steroid injection with fluoroscopy guidance and last resort of surgery    Based on the above findings and the clinical response to the opioids medications and improvement of the activities of daily living, sleep, and work performance. We made this complex  decision to continue the opioids therapy in light of the evidence of the patient's responsibility in using the pain medications as prescribed for the nonmalignant chronic pain condition. We discussed about the use of the pain medications to treat the symptoms of chronic nonmalignant pain and we are not trying the repair the permanent damage in the tissues, rather we are trying to control the symptoms induced by the permanent damage to the tissues inducing the chronic pain condition and resulting disability. I explained the difference and discussed it with the patient and stressed the importance of knowing the difference especially because of the potential side effects and the potential addicting effect and habit forming nature of the dangerous drugs we are using to treat the symptoms of the chronic pain.      We discussed that we are prescribing the medications on good leo and legitimate medical reason.     We reviewed the side effects and precautions of opioids prescriptions as discussed in the opioids treatment agreement.    realizes the interaction between the therapeutic classes including the respiratory depression and potential death     Random drug testing twice in 6 months we will submit     Hydromorphone 12 ER once a day and hydrocodone 5 mg tid  has a narcan at home know how and when to use it if needed.   Amitiza forOIC  Nabumetone and trazodone    Discussed about NSAIDS and I explained about the opioids sparing effect to allow keeping the opioids dose at minimal effective dose.   I went over the potential side effects of the NSAIDS on the gastrointestinal, renal and cardiovascular systems.      I detailed the side effects from the acetaminophen in the medication and made aware of those. I also explained about the cumulative effects on the organs and mainly the liver.     Given the opioids therapy , we discussed about the risk for accidental over dose on the pain medications, either for patient or other  household. I went over the mechanism of action and mode of use of the Naloxone according to the  recommendations. I will provide a prescription for a kit.     Follow-up 4 weeks or earlier if needed     The level of clinical decision making in this office visit,  is high, given the high risks of complications with the morbidity and mortality due to the fact that acute and chronic pain may pose a threat to life and bodily function, if under treated, poorly treated, or with failure to maintain adequate treatment and timely medical follow up. Additionally over treatment has its own set of complications including overdosing on the pain medications and also the habit forming potentials with the use of the medications used to treat chronic painful conditions including therapeutic classes classified as dangerous medications. Given the serious and fluctuating nature of pain level and instensity with extensive consideration for whenever pain changes, there is always the risk of prolonged functional impairment requiring close patient monitoring with regular assessments and reassessments and high level medical decision making at every office visit. The amount and complexity of data reviewed is high given the patient clinical presentation, labs,  data, radiology reports, and other tests as discussed during office visits. Pertinent data whether positive or negative were taken in consideration in the process of making this high level medical decision.

## 2024-02-20 ENCOUNTER — OFFICE VISIT (OUTPATIENT)
Dept: PAIN MEDICINE | Facility: CLINIC | Age: 64
End: 2024-02-20
Payer: COMMERCIAL

## 2024-02-20 DIAGNOSIS — S31.000A OPEN TRAUMATIC SUBLUXATION OF PELVIS, INITIAL ENCOUNTER: ICD-10-CM

## 2024-02-20 DIAGNOSIS — M46.1 BILATERAL SACROILIITIS (CMS-HCC): ICD-10-CM

## 2024-02-20 DIAGNOSIS — S22.41XA CLOSED FRACTURE OF FOUR RIBS OF RIGHT SIDE, INITIAL ENCOUNTER: ICD-10-CM

## 2024-02-20 DIAGNOSIS — S32.10XA CLOSED FRACTURE OF SACRUM, UNSPECIFIED PORTION OF SACRUM, INITIAL ENCOUNTER (MULTI): ICD-10-CM

## 2024-02-20 DIAGNOSIS — M43.27 ANKYLOSIS OF LUMBOSACRAL JOINT: Primary | ICD-10-CM

## 2024-02-20 DIAGNOSIS — S46.011A RUPTURE OF RIGHT SUPRASPINATUS TENDON, INITIAL ENCOUNTER: ICD-10-CM

## 2024-02-20 DIAGNOSIS — S33.30XA OPEN TRAUMATIC SUBLUXATION OF PELVIS, INITIAL ENCOUNTER: ICD-10-CM

## 2024-02-20 PROCEDURE — 99214 OFFICE O/P EST MOD 30 MIN: CPT | Performed by: PHYSICAL MEDICINE & REHABILITATION

## 2024-02-20 RX ORDER — HYDROCODONE BITARTRATE AND ACETAMINOPHEN 5; 325 MG/1; MG/1
1 TABLET ORAL EVERY 8 HOURS PRN
Qty: 84 TABLET | Refills: 0 | Status: SHIPPED | OUTPATIENT
Start: 2024-02-22 | End: 2024-03-14 | Stop reason: SDUPTHER

## 2024-02-20 RX ORDER — LUBIPROSTONE 24 UG/1
24 CAPSULE, GELATIN COATED ORAL 2 TIMES DAILY
Qty: 56 CAPSULE | Refills: 0 | Status: SHIPPED | OUTPATIENT
Start: 2024-02-20 | End: 2024-02-21 | Stop reason: SDUPTHER

## 2024-02-20 RX ORDER — HYDROMORPHONE HYDROCHLORIDE 12 MG/1
1 TABLET, EXTENDED RELEASE ORAL DAILY
Qty: 28 TABLET | Refills: 0 | Status: SHIPPED | OUTPATIENT
Start: 2024-02-22 | End: 2024-03-14 | Stop reason: SDUPTHER

## 2024-02-20 RX ORDER — TRAZODONE HYDROCHLORIDE 50 MG/1
50 TABLET ORAL NIGHTLY
Qty: 28 TABLET | Refills: 0 | Status: SHIPPED | OUTPATIENT
Start: 2024-02-20 | End: 2024-03-14 | Stop reason: SDUPTHER

## 2024-02-20 RX ORDER — NABUMETONE 500 MG/1
500 TABLET, FILM COATED ORAL 2 TIMES DAILY
Qty: 60 TABLET | Refills: 0 | Status: SHIPPED | OUTPATIENT
Start: 2024-02-20 | End: 2024-03-14 | Stop reason: SDUPTHER

## 2024-02-20 NOTE — PROGRESS NOTES
[facility] [id number]  DOI 1.18.2007      · Rupture of supraspinatus tendon (840.4) (S46.019A)   · Open traumatic subluxation of pelvis, initial encounter (839.79) (S33.30XA,S31.000A)   · Bilateral sacroiliitis (720.2) (M46.1)   · Ankylosis of lumbosacral joint (724.6) (M43.27)      Chief complaint  Back and shoulder pain and shoulder     History  Mr Thomson is back for visit.   He had SIJ injecitonin October that is holding the pain but he feels that the symptoms are starting to get worse.he wants to hold on the injections for a while  Yet  Pain is bilaterally but worse on right The pain in the back is deep on the right  side.  The pain is below the belt line, it is continuous but it gets worse with extension of the lumbar spine and with leading for the right leg.  It improves with leaning forward.  Sitting up increases the pain. Improves with laying on the side. There are no reported radiation of the pain to the lower limbs.     No bowel or bladder incontinence.  No sensory or motor changes in the lower limbs.    No changes or in the color or textures of the skin of the lower limbs.     The shoulder is controlled but occasionally catches. No radicular symtpoms      Pain level without medication is 8/10 , with the medication pain level 4/10.     The pain meds are helping control the pain and improving Activities of Daily living and quality of life and quality of sleep.    opioids treatment agreement Jan 2024  PDI (Pain Disability Index) score: 57 Dw him about consider psych follow up   Oarrs pulled and scanned in the chart  no concerns  last urine toxicology testing earlier this year and it was compliant we will repeat  Xray updated spine  ORT Score is  0  Pain pathology and pain generators sine   Modalities tried injection, surgery, physical therapy, TENS unit, nonsteroidal anti-inflammatory medication acup andfusion      Denied any fever or chills. No weight loss and no night sweats. No cough or sputum production.  No diarrhea   The constipation has been responding to fibers and over the counter medications.     No bladder and bowel incontinence and no other changes in bladder and bowel. No skin changes.  Reports tiredness and fatigability only if the pain is not controlled.   Denied opioids diversion and abuse and denies alcoholism. Denies overuse of the pain medications.    The control of the pain with the pain medications is helping the control of the symptoms and allowing the function and activities of daily living, enjoyment of life, improving the quality of life and sleep with less interruption by the pain. The goal is symptomatic control of the nonmalignant chronic pain and not to repair the permanent damage in the tissues inducing the chronic pain conditions. We are aiming to shift the focus from the nonmalignant chronic pain to other aspects of life by symptomatically treating this chronic pain. If this pain is not treated it will lead to major morbidity and it is also associated with increased risks of mortality. The patient understands those very clearly and also understand high risks of morbidity and mortality if not strictly adherent to the treatment recommendations and reporting any associated side effects. Also patient understand the full responsibility associated with these medications to avoid abuse or overuse or any use of these medications for anything besides treating the patient's own chronic pain and nothing else under any circumstances.        Physical examination  Awake, alert and oriented for time place and persons   declined Chaperone for the visit and was adequately  draped for the exam.    Loading of the SIJ   Pain inhibition of the hips Manual Muscle strength testing because of the pain at the lower back of and over SIJ. the endurance is decreased even though the initial resistance was 5/5 but could not keep beyond initial resistance.    No radicular pain     Diagnosis  Problem List Items Addressed  This Visit       Ankylosis of lumbosacral joint - Primary    Relevant Medications    HYDROcodone-acetaminophen (Norco) 5-325 mg tablet (Start on 2/22/2024)    HYDROmorphone 12 mg tablet extended release 24 hr (Start on 2/22/2024)    nabumetone (Relafen) 500 mg tablet    traZODone (Desyrel) 50 mg tablet    Amitiza 24 mcg capsule    Bilateral sacroiliitis (CMS/HCC)    Relevant Medications    HYDROcodone-acetaminophen (Norco) 5-325 mg tablet (Start on 2/22/2024)    HYDROmorphone 12 mg tablet extended release 24 hr (Start on 2/22/2024)    nabumetone (Relafen) 500 mg tablet    traZODone (Desyrel) 50 mg tablet    Amitiza 24 mcg capsule    Closed fracture of four ribs    Relevant Medications    HYDROcodone-acetaminophen (Norco) 5-325 mg tablet (Start on 2/22/2024)    HYDROmorphone 12 mg tablet extended release 24 hr (Start on 2/22/2024)    nabumetone (Relafen) 500 mg tablet    traZODone (Desyrel) 50 mg tablet    Amitiza 24 mcg capsule    Closed fracture of sacrum (CMS/HCC)    Relevant Medications    HYDROcodone-acetaminophen (Norco) 5-325 mg tablet (Start on 2/22/2024)    HYDROmorphone 12 mg tablet extended release 24 hr (Start on 2/22/2024)    nabumetone (Relafen) 500 mg tablet    traZODone (Desyrel) 50 mg tablet    Amitiza 24 mcg capsule    Open traumatic subluxation of pelvis    Relevant Medications    HYDROcodone-acetaminophen (Norco) 5-325 mg tablet (Start on 2/22/2024)    HYDROmorphone 12 mg tablet extended release 24 hr (Start on 2/22/2024)    nabumetone (Relafen) 500 mg tablet    traZODone (Desyrel) 50 mg tablet    Amitiza 24 mcg capsule    Rupture of supraspinatus tendon    Relevant Medications    HYDROcodone-acetaminophen (Norco) 5-325 mg tablet (Start on 2/22/2024)    HYDROmorphone 12 mg tablet extended release 24 hr (Start on 2/22/2024)    nabumetone (Relafen) 500 mg tablet    traZODone (Desyrel) 50 mg tablet    Amitiza 24 mcg capsule        Plan  Reviewed the pain generators.  Went over the types of pain with  neuropathic and nociceptive and different pathologies and therapeutic modalities. Discussed the mechanism of action of interventions from acupuncture, physical therapy , regular exercises, injections, botox, spinal cord stimulation, and role of surgery     Went over pathology of the intervertebral disc displacement and the anatomical relation to the Nerve roots and relation to the radicular symptoms. Went over treatment modalities with conservative treatment including acupuncture   and epidural steroid injection with fluoroscopy guidance and last resort of surgery    Based on the above findings and the clinical response to the opioids medications and improvement of the activities of daily living, sleep, and work performance. We made this complex decision to continue the opioids therapy in light of the evidence of the patient's responsibility in using the pain medications as prescribed for the nonmalignant chronic pain condition. We discussed about the use of the pain medications to treat the symptoms of chronic nonmalignant pain and we are not trying the repair the permanent damage in the tissues, rather we are trying to control the symptoms induced by the permanent damage to the tissues inducing the chronic pain condition and resulting disability. I explained the difference and discussed it with the patient and stressed the importance of knowing the difference especially because of the potential side effects and the potential addicting effect and habit forming nature of the dangerous drugs we are using to treat the symptoms of the chronic pain.      We discussed that we are prescribing the medications on good leo and legitimate medical reason.     We reviewed the side effects and precautions of opioids prescriptions as discussed in the opioids treatment agreement.    realizes the interaction between the therapeutic classes including the respiratory depression and potential death     Random drug testing twice in 6  months we will submit     Hydromorphone for ER once a day  Hydrocodone IR  TID for pain   Continue with trazodone  And amitiza  has a narcan at home know how and when to use it if needed.  Consider cutting back on the pain meds     Discussed about NSAIDS and I explained about the opioids sparing effect to allow keeping the opioids dose at minimal effective dose.   I went over the potential side effects of the NSAIDS on the gastrointestinal, renal and cardiovascular systems.      I detailed the side effects from the acetaminophen in the medication and made aware of those. I also explained about the cumulative effects on the organs and mainly the liver.     Given the opioids therapy , we discussed about the risk for accidental over dose on the pain medications, either for patient or other household. I went over the mechanism of action and mode of use of the Naloxone according to the  recommendations. I will provide a prescription for a kit.     Follow-up 8 weeks or earlier if needed     The level of clinical decision making in this office visit,  is high, given the high risks of complications with the morbidity and mortality due to the fact that acute and chronic pain may pose a threat to life and bodily function, if under treated, poorly treated, or with failure to maintain adequate treatment and timely medical follow up. Additionally over treatment has its own set of complications including overdosing on the pain medications and also the habit forming potentials with the use of the medications used to treat chronic painful conditions including therapeutic classes classified as dangerous medications. Given the serious and fluctuating nature of pain level and instensity with extensive consideration for whenever pain changes, there is always the risk of prolonged functional impairment requiring close patient monitoring with regular assessments and reassessments and high level medical decision making at  every office visit. The amount and complexity of data reviewed is high given the patient clinical presentation, labs,  data, radiology reports, and other tests as discussed during office visits. Pertinent data whether positive or negative were taken in consideration in the process of making this high level medical decision.

## 2024-02-21 ENCOUNTER — TELEPHONE (OUTPATIENT)
Dept: PAIN MEDICINE | Facility: CLINIC | Age: 64
End: 2024-02-21
Payer: COMMERCIAL

## 2024-02-21 DIAGNOSIS — M43.27 ANKYLOSIS OF LUMBOSACRAL JOINT: ICD-10-CM

## 2024-02-21 DIAGNOSIS — S46.011A RUPTURE OF RIGHT SUPRASPINATUS TENDON, INITIAL ENCOUNTER: ICD-10-CM

## 2024-02-21 DIAGNOSIS — M46.1 BILATERAL SACROILIITIS (CMS-HCC): ICD-10-CM

## 2024-02-21 DIAGNOSIS — S33.30XA OPEN TRAUMATIC SUBLUXATION OF PELVIS, INITIAL ENCOUNTER: ICD-10-CM

## 2024-02-21 DIAGNOSIS — S31.000A OPEN TRAUMATIC SUBLUXATION OF PELVIS, INITIAL ENCOUNTER: ICD-10-CM

## 2024-02-21 DIAGNOSIS — S32.10XA CLOSED FRACTURE OF SACRUM, UNSPECIFIED PORTION OF SACRUM, INITIAL ENCOUNTER (MULTI): ICD-10-CM

## 2024-02-21 DIAGNOSIS — S22.41XA CLOSED FRACTURE OF FOUR RIBS OF RIGHT SIDE, INITIAL ENCOUNTER: ICD-10-CM

## 2024-02-21 RX ORDER — LUBIPROSTONE 24 UG/1
24 CAPSULE ORAL 2 TIMES DAILY
Qty: 56 CAPSULE | Refills: 0 | Status: SHIPPED | OUTPATIENT
Start: 2024-02-21 | End: 2024-03-14 | Stop reason: SDUPTHER

## 2024-02-21 NOTE — TELEPHONE ENCOUNTER
Pharmacist is asking if there is anyway to change the rx to REMOVE SHELTON, because every month it has it and he always has to call and the patient has always had generic and does not want brand name.  He says it will just same everyone time from the phone calls.

## 2024-02-21 NOTE — TELEPHONE ENCOUNTER
Dr. Medrano pharmacy is calling regarding Amitiza rx  Prescription has SHELTON on it for this month, last month it did not.  [facility] will require PA if it stays this way.  Is this correct?

## 2024-03-14 ENCOUNTER — OFFICE VISIT (OUTPATIENT)
Dept: PAIN MEDICINE | Facility: CLINIC | Age: 64
End: 2024-03-14
Payer: COMMERCIAL

## 2024-03-14 DIAGNOSIS — S31.000A OPEN TRAUMATIC SUBLUXATION OF PELVIS, INITIAL ENCOUNTER: ICD-10-CM

## 2024-03-14 DIAGNOSIS — S22.41XA CLOSED FRACTURE OF FOUR RIBS OF RIGHT SIDE, INITIAL ENCOUNTER: ICD-10-CM

## 2024-03-14 DIAGNOSIS — M43.27 ANKYLOSIS OF LUMBOSACRAL JOINT: ICD-10-CM

## 2024-03-14 DIAGNOSIS — M46.1 BILATERAL SACROILIITIS (CMS-HCC): Primary | ICD-10-CM

## 2024-03-14 DIAGNOSIS — S33.30XA OPEN TRAUMATIC SUBLUXATION OF PELVIS, INITIAL ENCOUNTER: ICD-10-CM

## 2024-03-14 DIAGNOSIS — S46.011A RUPTURE OF RIGHT SUPRASPINATUS TENDON, INITIAL ENCOUNTER: ICD-10-CM

## 2024-03-14 DIAGNOSIS — S32.10XA CLOSED FRACTURE OF SACRUM, UNSPECIFIED PORTION OF SACRUM, INITIAL ENCOUNTER (MULTI): ICD-10-CM

## 2024-03-14 PROCEDURE — 1036F TOBACCO NON-USER: CPT | Performed by: PHYSICAL MEDICINE & REHABILITATION

## 2024-03-14 PROCEDURE — 99214 OFFICE O/P EST MOD 30 MIN: CPT | Performed by: PHYSICAL MEDICINE & REHABILITATION

## 2024-03-14 RX ORDER — LUBIPROSTONE 24 UG/1
24 CAPSULE ORAL 2 TIMES DAILY
Qty: 56 CAPSULE | Refills: 0 | Status: SHIPPED | OUTPATIENT
Start: 2024-03-14 | End: 2024-04-18 | Stop reason: SDUPTHER

## 2024-03-14 RX ORDER — HYDROCODONE BITARTRATE AND ACETAMINOPHEN 5; 325 MG/1; MG/1
1 TABLET ORAL EVERY 8 HOURS PRN
Qty: 84 TABLET | Refills: 0 | Status: SHIPPED | OUTPATIENT
Start: 2024-03-25 | End: 2024-03-26 | Stop reason: SDUPTHER

## 2024-03-14 RX ORDER — TRAZODONE HYDROCHLORIDE 50 MG/1
50 TABLET ORAL NIGHTLY
Qty: 28 TABLET | Refills: 0 | Status: SHIPPED | OUTPATIENT
Start: 2024-03-14 | End: 2024-03-25 | Stop reason: SDUPTHER

## 2024-03-14 RX ORDER — HYDROMORPHONE HYDROCHLORIDE 12 MG/1
1 TABLET, EXTENDED RELEASE ORAL DAILY
Qty: 28 TABLET | Refills: 0 | Status: SHIPPED | OUTPATIENT
Start: 2024-03-25 | End: 2024-04-18 | Stop reason: SDUPTHER

## 2024-03-14 RX ORDER — NABUMETONE 500 MG/1
500 TABLET, FILM COATED ORAL 2 TIMES DAILY
Qty: 60 TABLET | Refills: 0 | Status: SHIPPED
Start: 2024-03-14 | End: 2024-04-18 | Stop reason: ALTCHOICE

## 2024-03-14 NOTE — PROGRESS NOTES
[facility] [id number]  DOI 1.18.2007      · Rupture of supraspinatus tendon (840.4) (S46.019A)   · Open traumatic subluxation of pelvis, initial encounter (839.79) (S33.30XA,S31.000A)   · Bilateral sacroiliitis (720.2) (M46.1)   · Ankylosis of lumbosacral joint (724.6) (M43.27)    Chief complaint  Back pain     History  Jared Thomson is back for pain management office visit  Continues with pain in the back and r shoulder  Worse in the R lower back  The pain in the back is deep on the right  side.  The pain is below the belt line, it is continuous but it gets worse with extension of the lumbar spine and with leading for the right leg.  It improves with leaning forward.  Sitting up increases the pain. Improves with laying on the side. There are no reported radiation of the pain to the lower limbs.     No bowel or bladder incontinence.  No sensory or motor changes in the lower limbs.    No changes or in the color or textures of the skin of the lower limbs.     Similar on the left side      Pain level without medication is 8/10 , with the medication pain level 3 to 4 /10.     The pain meds are helping control the pain and improving Activities of Daily living and quality of life and quality of sleep.    opioids treatment agreement Jan 2024  PDI (Pain Disability Index) score: 35  Oarrs pulled and scanned in the chart  no concerns  last urine toxicology testing earlier this year and it was compliant we will repeat  Xray updated spine   ORT Score is  0  Pain pathology and pain generators spine   Modalities tried injection, surgery, physical therapy, TENS unit, nonsteroidal anti-inflammatory medication       Denied any fever or chills. No weight loss and no night sweats. No cough or sputum production. No diarrhea   The constipation has been responding to fibers and over the counter medications.     No bladder and bowel incontinence and no other changes in bladder and bowel. No skin changes.  Reports tiredness and  fatigability only if the pain is not controlled.   Denied opioids diversion and abuse and denies alcoholism. Denies overuse of the pain medications.    The control of the pain with the pain medications is helping the control of the symptoms and allowing the function and activities of daily living, enjoyment of life, improving the quality of life and sleep with less interruption by the pain. The goal is symptomatic control of the nonmalignant chronic pain and not to repair the permanent damage in the tissues inducing the chronic pain conditions. We are aiming to shift the focus from the nonmalignant chronic pain to other aspects of life by symptomatically treating this chronic pain. If this pain is not treated it will lead to major morbidity and it is also associated with increased risks of mortality. The patient understands those very clearly and also understand high risks of morbidity and mortality if not strictly adherent to the treatment recommendations and reporting any associated side effects. Also patient understand the full responsibility associated with these medications to avoid abuse or overuse or any use of these medications for anything besides treating the patient's own chronic pain and nothing else under any circumstances.        Physical examination  Awake, alert and oriented for time place and persons   declined Chaperone for the visit and was adequately  draped for the exam.    Examination of the lumbar spine and the SIJ area showed mild reversal of the lumbar lordosis with slight scoliosis with right-sided concavity.  The scoliosis slightly corrected upon bending of the lumbar spine.  Tight muscle bands over the right  lower lumbar area and over the upper buttock area.  Gaenslen and Peter are positive on the right side. Also compression test of the right SIJ is positive increasing the pain.   Cardona test negative with no pain upon provocative testing of the lower facet joints  Straight leg raising  was negative on both sides.  No sensorimotor deficits in the lower limbs.  Abdirahman testing were negative for axial loading and log rotation.  No aberrant pain behavior.   Similar to the left side     Diagnosis  Problem List Items Addressed This Visit       Ankylosis of lumbosacral joint    Relevant Medications    HYDROcodone-acetaminophen (Norco) 5-325 mg tablet (Start on 3/25/2024)    HYDROmorphone 12 mg tablet extended release 24 hr (Start on 3/25/2024)    nabumetone (Relafen) 500 mg tablet    lubiprostone (Amitiza) 24 mcg capsule    traZODone (Desyrel) 50 mg tablet    Other Relevant Orders    Drug Screen, Urine With Reflex to Confirmation    Bilateral sacroiliitis (CMS/HCC) - Primary    Relevant Medications    HYDROcodone-acetaminophen (Norco) 5-325 mg tablet (Start on 3/25/2024)    HYDROmorphone 12 mg tablet extended release 24 hr (Start on 3/25/2024)    nabumetone (Relafen) 500 mg tablet    lubiprostone (Amitiza) 24 mcg capsule    traZODone (Desyrel) 50 mg tablet    Other Relevant Orders    Drug Screen, Urine With Reflex to Confirmation    Closed fracture of four ribs    Relevant Medications    HYDROcodone-acetaminophen (Norco) 5-325 mg tablet (Start on 3/25/2024)    HYDROmorphone 12 mg tablet extended release 24 hr (Start on 3/25/2024)    nabumetone (Relafen) 500 mg tablet    lubiprostone (Amitiza) 24 mcg capsule    traZODone (Desyrel) 50 mg tablet    Other Relevant Orders    Drug Screen, Urine With Reflex to Confirmation    Closed fracture of sacrum (CMS/HCC)    Relevant Medications    HYDROcodone-acetaminophen (Norco) 5-325 mg tablet (Start on 3/25/2024)    HYDROmorphone 12 mg tablet extended release 24 hr (Start on 3/25/2024)    nabumetone (Relafen) 500 mg tablet    lubiprostone (Amitiza) 24 mcg capsule    traZODone (Desyrel) 50 mg tablet    Other Relevant Orders    Drug Screen, Urine With Reflex to Confirmation    Open traumatic subluxation of pelvis    Relevant Medications    HYDROcodone-acetaminophen  (Norco) 5-325 mg tablet (Start on 3/25/2024)    HYDROmorphone 12 mg tablet extended release 24 hr (Start on 3/25/2024)    nabumetone (Relafen) 500 mg tablet    lubiprostone (Amitiza) 24 mcg capsule    traZODone (Desyrel) 50 mg tablet    Other Relevant Orders    Drug Screen, Urine With Reflex to Confirmation    Rupture of supraspinatus tendon    Relevant Medications    HYDROcodone-acetaminophen (Norco) 5-325 mg tablet (Start on 3/25/2024)    HYDROmorphone 12 mg tablet extended release 24 hr (Start on 3/25/2024)    nabumetone (Relafen) 500 mg tablet    lubiprostone (Amitiza) 24 mcg capsule    traZODone (Desyrel) 50 mg tablet    Other Relevant Orders    Drug Screen, Urine With Reflex to Confirmation        Plan  Reviewed the pain generators.  Went over the types of pain with neuropathic and nociceptive and different pathologies and therapeutic modalities. Discussed the mechanism of action of interventions from acupuncture, physical therapy , regular exercises, injections, botox, spinal cord stimulation, and role of surgery     Went over pathology of the intervertebral disc displacement and the anatomical relation to the Nerve roots and relation to the radicular symptoms. Went over treatment modalities with conservative treatment including acupuncture   and epidural steroid injection with fluoroscopy guidance and last resort of surgery    Based on the above findings and the clinical response to the opioids medications and improvement of the activities of daily living, sleep, and work performance. We made this complex decision to continue the opioids therapy in light of the evidence of the patient's responsibility in using the pain medications as prescribed for the nonmalignant chronic pain condition. We discussed about the use of the pain medications to treat the symptoms of chronic nonmalignant pain and we are not trying the repair the permanent damage in the tissues, rather we are trying to control the symptoms  induced by the permanent damage to the tissues inducing the chronic pain condition and resulting disability. I explained the difference and discussed it with the patient and stressed the importance of knowing the difference especially because of the potential side effects and the potential addicting effect and habit forming nature of the dangerous drugs we are using to treat the symptoms of the chronic pain.      We discussed that we are prescribing the medications on good leo and legitimate medical reason.     We reviewed the side effects and precautions of opioids prescriptions as discussed in the opioids treatment agreement.    realizes the interaction between the therapeutic classes including the respiratory depression and potential death     Random drug testing twice in 6 months we will submit     has a narcan at home know how and when to use it if needed.  Discussed about considering cut back on pain medications   Excuse from [employer]   Hydromorpone 12 mg a day  Hydrocodone 5 mg tid     Discussed about NSAIDS and I explained about the opioids sparing effect to allow keeping the opioids dose at minimal effective dose.   I went over the potential side effects of the NSAIDS on the gastrointestinal, renal and cardiovascular systems.      I detailed the side effects from the acetaminophen in the medication and made aware of those. I also explained about the cumulative effects on the organs and mainly the liver.     Given the opioids therapy , we discussed about the risk for accidental over dose on the pain medications, either for patient or other household. I went over the mechanism of action and mode of use of the Naloxone according to the  recommendations. I will provide a prescription for a kit.     Follow-up 4 weeks or earlier if needed     The level of clinical decision making in this office visit,  is high, given the high risks of complications with the morbidity and mortality due to  the fact that acute and chronic pain may pose a threat to life and bodily function, if under treated, poorly treated, or with failure to maintain adequate treatment and timely medical follow up. Additionally over treatment has its own set of complications including overdosing on the pain medications and also the habit forming potentials with the use of the medications used to treat chronic painful conditions including therapeutic classes classified as dangerous medications. Given the serious and fluctuating nature of pain level and instensity with extensive consideration for whenever pain changes, there is always the risk of prolonged functional impairment requiring close patient monitoring with regular assessments and reassessments and high level medical decision making at every office visit. The amount and complexity of data reviewed is high given the patient clinical presentation, labs,  data, radiology reports, and other tests as discussed during office visits. Pertinent data whether positive or negative were taken in consideration in the process of making this high level medical decision.

## 2024-03-25 DIAGNOSIS — M43.27 ANKYLOSIS OF LUMBOSACRAL JOINT: ICD-10-CM

## 2024-03-25 DIAGNOSIS — S22.41XA CLOSED FRACTURE OF FOUR RIBS OF RIGHT SIDE, INITIAL ENCOUNTER: ICD-10-CM

## 2024-03-25 DIAGNOSIS — M46.1 BILATERAL SACROILIITIS (CMS-HCC): ICD-10-CM

## 2024-03-25 DIAGNOSIS — S32.10XA CLOSED FRACTURE OF SACRUM, UNSPECIFIED PORTION OF SACRUM, INITIAL ENCOUNTER (MULTI): ICD-10-CM

## 2024-03-25 DIAGNOSIS — S31.000A OPEN TRAUMATIC SUBLUXATION OF PELVIS, INITIAL ENCOUNTER: ICD-10-CM

## 2024-03-25 DIAGNOSIS — S46.011A RUPTURE OF RIGHT SUPRASPINATUS TENDON, INITIAL ENCOUNTER: ICD-10-CM

## 2024-03-25 DIAGNOSIS — S33.30XA OPEN TRAUMATIC SUBLUXATION OF PELVIS, INITIAL ENCOUNTER: ICD-10-CM

## 2024-03-25 RX ORDER — TRAZODONE HYDROCHLORIDE 50 MG/1
50 TABLET ORAL NIGHTLY
Qty: 28 TABLET | Refills: 0 | Status: SHIPPED | OUTPATIENT
Start: 2024-03-25 | End: 2024-04-18 | Stop reason: SDUPTHER

## 2024-03-26 DIAGNOSIS — S33.30XA OPEN TRAUMATIC SUBLUXATION OF PELVIS, INITIAL ENCOUNTER: ICD-10-CM

## 2024-03-26 DIAGNOSIS — M43.27 ANKYLOSIS OF LUMBOSACRAL JOINT: ICD-10-CM

## 2024-03-26 DIAGNOSIS — S22.41XA CLOSED FRACTURE OF FOUR RIBS OF RIGHT SIDE, INITIAL ENCOUNTER: ICD-10-CM

## 2024-03-26 DIAGNOSIS — M46.1 BILATERAL SACROILIITIS (CMS-HCC): ICD-10-CM

## 2024-03-26 DIAGNOSIS — S46.011A RUPTURE OF RIGHT SUPRASPINATUS TENDON, INITIAL ENCOUNTER: ICD-10-CM

## 2024-03-26 DIAGNOSIS — S32.10XA CLOSED FRACTURE OF SACRUM, UNSPECIFIED PORTION OF SACRUM, INITIAL ENCOUNTER (MULTI): ICD-10-CM

## 2024-03-26 DIAGNOSIS — S31.000A OPEN TRAUMATIC SUBLUXATION OF PELVIS, INITIAL ENCOUNTER: ICD-10-CM

## 2024-03-26 RX ORDER — HYDROCODONE BITARTRATE AND ACETAMINOPHEN 5; 325 MG/1; MG/1
1 TABLET ORAL EVERY 8 HOURS PRN
Qty: 84 TABLET | Refills: 0 | Status: SHIPPED | OUTPATIENT
Start: 2024-03-26 | End: 2024-04-18 | Stop reason: SDUPTHER

## 2024-04-18 ENCOUNTER — OFFICE VISIT (OUTPATIENT)
Dept: PAIN MEDICINE | Facility: CLINIC | Age: 64
End: 2024-04-18
Payer: COMMERCIAL

## 2024-04-18 DIAGNOSIS — S33.30XA OPEN TRAUMATIC SUBLUXATION OF PELVIS, INITIAL ENCOUNTER: ICD-10-CM

## 2024-04-18 DIAGNOSIS — M43.27 ANKYLOSIS OF LUMBOSACRAL JOINT: ICD-10-CM

## 2024-04-18 DIAGNOSIS — S32.10XA CLOSED FRACTURE OF SACRUM, UNSPECIFIED PORTION OF SACRUM, INITIAL ENCOUNTER (MULTI): ICD-10-CM

## 2024-04-18 DIAGNOSIS — S22.41XA CLOSED FRACTURE OF FOUR RIBS OF RIGHT SIDE, INITIAL ENCOUNTER: ICD-10-CM

## 2024-04-18 DIAGNOSIS — S46.011A RUPTURE OF RIGHT SUPRASPINATUS TENDON, INITIAL ENCOUNTER: Primary | ICD-10-CM

## 2024-04-18 DIAGNOSIS — M46.1 BILATERAL SACROILIITIS (CMS-HCC): ICD-10-CM

## 2024-04-18 DIAGNOSIS — S31.000A OPEN TRAUMATIC SUBLUXATION OF PELVIS, INITIAL ENCOUNTER: ICD-10-CM

## 2024-04-18 PROCEDURE — 99214 OFFICE O/P EST MOD 30 MIN: CPT | Performed by: PHYSICAL MEDICINE & REHABILITATION

## 2024-04-18 RX ORDER — NABUMETONE 500 MG/1
500 TABLET, FILM COATED ORAL 2 TIMES DAILY
Qty: 60 TABLET | Refills: 11 | Status: SHIPPED | OUTPATIENT
Start: 2024-04-18 | End: 2024-05-16 | Stop reason: SDUPTHER

## 2024-04-18 RX ORDER — HYDROMORPHONE HYDROCHLORIDE 12 MG/1
1 TABLET, EXTENDED RELEASE ORAL DAILY
Qty: 28 TABLET | Refills: 0 | Status: SHIPPED | OUTPATIENT
Start: 2024-04-22 | End: 2024-05-16 | Stop reason: SDUPTHER

## 2024-04-18 RX ORDER — LUBIPROSTONE 24 UG/1
24 CAPSULE ORAL 2 TIMES DAILY
Qty: 56 CAPSULE | Refills: 0 | Status: SHIPPED | OUTPATIENT
Start: 2024-04-18 | End: 2024-05-16 | Stop reason: SDUPTHER

## 2024-04-18 RX ORDER — HYDROCODONE BITARTRATE AND ACETAMINOPHEN 5; 325 MG/1; MG/1
1 TABLET ORAL EVERY 8 HOURS PRN
Qty: 84 TABLET | Refills: 0 | Status: SHIPPED | OUTPATIENT
Start: 2024-04-24 | End: 2024-05-16 | Stop reason: SDUPTHER

## 2024-04-18 RX ORDER — TRAZODONE HYDROCHLORIDE 50 MG/1
50 TABLET ORAL NIGHTLY
Qty: 28 TABLET | Refills: 0 | Status: SHIPPED | OUTPATIENT
Start: 2024-04-18 | End: 2024-05-16 | Stop reason: SDUPTHER

## 2024-04-18 NOTE — PROGRESS NOTES
[facility] [id number]  DOI 1.18.2007      · Rupture of supraspinatus tendon (840.4) (S46.019A)   · Open traumatic subluxation of pelvis, initial encounter (839.79) (S33.30XA,S31.000A)   · Bilateral sacroiliitis (720.2) (M46.1)   · Ankylosis of lumbosacral joint (724.6) (M43.27)      Chief complaint  Back pain   R shoulder pain     History  Jared Thomson is back for pain management office visit  Continue with pain in the back affecting SIJ bilaterally  Examination of the lumbar spine and the SIJ area showed mild reversal of the lumbar lordosis with slight scoliosis with right-sided concavity.  The scoliosis slightly corrected upon bending of the lumbar spine.  Tight muscle bands over the right  lower lumbar area and over the upper buttock area.  Gaenslen and Peter are positive on the right side. Also compression test of the right SIJ is positive increasing the pain.   Cardona test negative with no pain upon provocative testing of the lower facet joints  Straight leg raising was negative on both sides.  No sensorimotor deficits in the lower limbs.  Abdirahman testing were negative for axial loading and log rotation.  No aberrant pain behavior.    Similar findings on the left side      Pain level without medication is 8/10 , with the medication pain level 3/10.     The pain meds are helping control the pain and improving Activities of Daily living and quality of life and quality of sleep.    opioids treatment agreement Jan 2024     Oarrs pulled and scanned in the chart  no concerns  last urine toxicology testing earlier this year and it was compliant we will repeat  Xray updated spine   ORT Score is  0  Pain pathology and pain generators spine and shoulder   Modalities tried injection, surgery, physical therapy, TENS unit, nonsteroidal anti-inflammatory medication       Denied any fever or chills. No weight loss and no night sweats. No cough or sputum production. No diarrhea   The constipation has been responding to  fibers and over the counter medications.     No bladder and bowel incontinence and no other changes in bladder and bowel. No skin changes.  Reports tiredness and fatigability only if the pain is not controlled.   Denied opioids diversion and abuse and denies alcoholism. Denies overuse of the pain medications.    The control of the pain with the pain medications is helping the control of the symptoms and allowing the function and activities of daily living, enjoyment of life, improving the quality of life and sleep with less interruption by the pain. The goal is symptomatic control of the nonmalignant chronic pain and not to repair the permanent damage in the tissues inducing the chronic pain conditions. We are aiming to shift the focus from the nonmalignant chronic pain to other aspects of life by symptomatically treating this chronic pain. If this pain is not treated it will lead to major morbidity and it is also associated with increased risks of mortality. The patient understands those very clearly and also understand high risks of morbidity and mortality if not strictly adherent to the treatment recommendations and reporting any associated side effects. Also patient understand the full responsibility associated with these medications to avoid abuse or overuse or any use of these medications for anything besides treating the patient's own chronic pain and nothing else under any circumstances.        Physical examination  Awake, alert and oriented for time place and persons   declined Chaperone for the visit and was adequately  draped for the exam.    Examination of the lumbar spine and the SIJ area showed mild reversal of the lumbar lordosis with slight scoliosis with right-sided concavity.  The scoliosis slightly corrected upon bending of the lumbar spine.  Tight muscle bands over the right  lower lumbar area and over the upper buttock area.  Gaenslen and Peter are positive on the right side. Also compression  test of the right SIJ is positive increasing the pain.   Cardona test negative with no pain upon provocative testing of the lower facet joints  Straight leg raising was negative on both sides.  No sensorimotor deficits in the lower limbs.  Abdirahman testing were negative for axial loading and log rotation.  No aberrant pain behavior.      Same on the left sij    Diagnosis  Problem List Items Addressed This Visit       Ankylosis of lumbosacral joint    Relevant Medications    HYDROmorphone 12 mg tablet extended release 24 hr (Start on 4/22/2024)    HYDROcodone-acetaminophen (Norco) 5-325 mg tablet (Start on 4/24/2024)    traZODone (Desyrel) 50 mg tablet    nabumetone (Relafen) 500 mg tablet    lubiprostone (Amitiza) 24 mcg capsule    Bilateral sacroiliitis (CMS-HCC)    Relevant Medications    HYDROmorphone 12 mg tablet extended release 24 hr (Start on 4/22/2024)    HYDROcodone-acetaminophen (Norco) 5-325 mg tablet (Start on 4/24/2024)    traZODone (Desyrel) 50 mg tablet    nabumetone (Relafen) 500 mg tablet    lubiprostone (Amitiza) 24 mcg capsule    Closed fracture of four ribs    Relevant Medications    HYDROmorphone 12 mg tablet extended release 24 hr (Start on 4/22/2024)    HYDROcodone-acetaminophen (Norco) 5-325 mg tablet (Start on 4/24/2024)    traZODone (Desyrel) 50 mg tablet    nabumetone (Relafen) 500 mg tablet    lubiprostone (Amitiza) 24 mcg capsule    Closed fracture of sacrum (Multi)    Relevant Medications    HYDROmorphone 12 mg tablet extended release 24 hr (Start on 4/22/2024)    HYDROcodone-acetaminophen (Norco) 5-325 mg tablet (Start on 4/24/2024)    traZODone (Desyrel) 50 mg tablet    nabumetone (Relafen) 500 mg tablet    lubiprostone (Amitiza) 24 mcg capsule    Open traumatic subluxation of pelvis    Relevant Medications    HYDROmorphone 12 mg tablet extended release 24 hr (Start on 4/22/2024)    HYDROcodone-acetaminophen (Norco) 5-325 mg tablet (Start on 4/24/2024)    traZODone (Desyrel) 50 mg tablet     nabumetone (Relafen) 500 mg tablet    lubiprostone (Amitiza) 24 mcg capsule    Rupture of supraspinatus tendon - Primary    Relevant Medications    HYDROmorphone 12 mg tablet extended release 24 hr (Start on 4/22/2024)    HYDROcodone-acetaminophen (Norco) 5-325 mg tablet (Start on 4/24/2024)    traZODone (Desyrel) 50 mg tablet    nabumetone (Relafen) 500 mg tablet    lubiprostone (Amitiza) 24 mcg capsule        Plan  Reviewed the pain generators.  Went over the types of pain with neuropathic and nociceptive and different pathologies and therapeutic modalities. Discussed the mechanism of action of interventions from acupuncture, physical therapy , regular exercises, injections, botox, spinal cord stimulation, and role of surgery     Went over pathology of the intervertebral disc displacement and the anatomical relation to the Nerve roots and relation to the radicular symptoms. Went over treatment modalities with conservative treatment including acupuncture   and epidural steroid injection with fluoroscopy guidance and last resort of surgery    Based on the above findings and the clinical response to the opioids medications and improvement of the activities of daily living, sleep, and work performance. We made this complex decision to continue the opioids therapy in light of the evidence of the patient's responsibility in using the pain medications as prescribed for the nonmalignant chronic pain condition. We discussed about the use of the pain medications to treat the symptoms of chronic nonmalignant pain and we are not trying the repair the permanent damage in the tissues, rather we are trying to control the symptoms induced by the permanent damage to the tissues inducing the chronic pain condition and resulting disability. I explained the difference and discussed it with the patient and stressed the importance of knowing the difference especially because of the potential side effects and the potential addicting  effect and habit forming nature of the dangerous drugs we are using to treat the symptoms of the chronic pain.      We discussed that we are prescribing the medications on good leo and legitimate medical reason.     We reviewed the side effects and precautions of opioids prescriptions as discussed in the opioids treatment agreement.    realizes the interaction between the therapeutic classes including the respiratory depression and potential death     Random drug testing twice in 6 months we will submit     Long discussion about putting effort in cutting back on pain medications. Discussed about pain level changing and we do not know if the medications at this amount are still needed until we try and cut back slowly on pain medications. If the cut is tolerated then we continue. If cut not tolerated then, will go back on the pain medications level.  The goal from this is to keep the pain medications at the lowest effective dose.  I discussed about  about considering increasing the dose of the long acting and cut back the number of doses of the short acting medications. That will decrease the number of doses being dispensed daily.  Hydrocodone ER and SA for breakthrough pain   Continue with nabumetone and amitiza     Discussed about NSAIDS and I explained about the opioids sparing effect to allow keeping the opioids dose at minimal effective dose.   I went over the potential side effects of the NSAIDS on the gastrointestinal, renal and cardiovascular systems.      I detailed the side effects from the acetaminophen in the medication and made aware of those. I also explained about the cumulative effects on the organs and mainly the liver.     Given the opioids therapy , we discussed about the risk for accidental over dose on the pain medications, either for patient or other household. I went over the mechanism of action and mode of use of the Naloxone according to the  recommendations. I will provide a  prescription for a kit.     Follow-up 4 weeks or earlier if needed     The level of clinical decision making in this office visit,  is high, given the high risks of complications with the morbidity and mortality due to the fact that acute and chronic pain may pose a threat to life and bodily function, if under treated, poorly treated, or with failure to maintain adequate treatment and timely medical follow up. Additionally over treatment has its own set of complications including overdosing on the pain medications and also the habit forming potentials with the use of the medications used to treat chronic painful conditions including therapeutic classes classified as dangerous medications. Given the serious and fluctuating nature of pain level and instensity with extensive consideration for whenever pain changes, there is always the risk of prolonged functional impairment requiring close patient monitoring with regular assessments and reassessments and high level medical decision making at every office visit. The amount and complexity of data reviewed is high given the patient clinical presentation, labs,  data, radiology reports, and other tests as discussed during office visits. Pertinent data whether positive or negative were taken in consideration in the process of making this high level medical decision.

## 2024-05-16 ENCOUNTER — OFFICE VISIT (OUTPATIENT)
Dept: PAIN MEDICINE | Facility: CLINIC | Age: 64
End: 2024-05-16
Payer: COMMERCIAL

## 2024-05-16 DIAGNOSIS — M43.27 ANKYLOSIS OF LUMBOSACRAL JOINT: ICD-10-CM

## 2024-05-16 DIAGNOSIS — S31.000A OPEN TRAUMATIC SUBLUXATION OF PELVIS, INITIAL ENCOUNTER: Primary | ICD-10-CM

## 2024-05-16 DIAGNOSIS — S22.41XA CLOSED FRACTURE OF FOUR RIBS OF RIGHT SIDE, INITIAL ENCOUNTER: ICD-10-CM

## 2024-05-16 DIAGNOSIS — S32.10XA CLOSED FRACTURE OF SACRUM, UNSPECIFIED PORTION OF SACRUM, INITIAL ENCOUNTER (MULTI): ICD-10-CM

## 2024-05-16 DIAGNOSIS — S33.30XA OPEN TRAUMATIC SUBLUXATION OF PELVIS, INITIAL ENCOUNTER: Primary | ICD-10-CM

## 2024-05-16 DIAGNOSIS — S46.011A RUPTURE OF RIGHT SUPRASPINATUS TENDON, INITIAL ENCOUNTER: ICD-10-CM

## 2024-05-16 DIAGNOSIS — M46.1 BILATERAL SACROILIITIS (CMS-HCC): ICD-10-CM

## 2024-05-16 PROCEDURE — 99214 OFFICE O/P EST MOD 30 MIN: CPT | Performed by: PHYSICAL MEDICINE & REHABILITATION

## 2024-05-16 RX ORDER — NABUMETONE 500 MG/1
500 TABLET, FILM COATED ORAL 2 TIMES DAILY
Qty: 60 TABLET | Refills: 11 | Status: SHIPPED | OUTPATIENT
Start: 2024-05-16 | End: 2025-05-16

## 2024-05-16 RX ORDER — TRAZODONE HYDROCHLORIDE 50 MG/1
50 TABLET ORAL NIGHTLY
Qty: 28 TABLET | Refills: 0 | Status: SHIPPED | OUTPATIENT
Start: 2024-05-16 | End: 2024-06-13

## 2024-05-16 RX ORDER — HYDROCODONE BITARTRATE AND ACETAMINOPHEN 5; 325 MG/1; MG/1
1 TABLET ORAL EVERY 8 HOURS PRN
Qty: 84 TABLET | Refills: 0 | Status: SHIPPED | OUTPATIENT
Start: 2024-05-23 | End: 2024-06-20

## 2024-05-16 RX ORDER — HYDROMORPHONE HYDROCHLORIDE 12 MG/1
1 TABLET, EXTENDED RELEASE ORAL DAILY
Qty: 28 TABLET | Refills: 0 | Status: SHIPPED | OUTPATIENT
Start: 2024-05-23 | End: 2024-06-20

## 2024-05-16 RX ORDER — LUBIPROSTONE 24 UG/1
24 CAPSULE ORAL 2 TIMES DAILY
Qty: 56 CAPSULE | Refills: 0 | Status: SHIPPED | OUTPATIENT
Start: 2024-05-16 | End: 2024-06-13

## 2024-05-16 NOTE — PROGRESS NOTES
[facility] [id number]  DOI 1.18.2007      · Rupture of supraspinatus tendon (840.4) (S46.019A)   · Open traumatic subluxation of pelvis, initial encounter (839.79) (S33.30XA,S31.000A)   · Bilateral sacroiliitis (720.2) (M46.1)   · Ankylosis of lumbosacral joint (724.6) (M43.27)      Chief complaint  Lower back pain getting worse again     History  Jared Thomson is back for pain management office visit  Pain in the SIJ getting worse gain   The pain in the back is deep on the right  side.  The pain is below the belt line, it is continuous but it gets worse with extension of the lumbar spine and with leading for the right leg.  It improves with leaning forward.  Sitting up increases the pain. Improves with laying on the side. There are no reported radiation of the pain to the lower limbs.     No bowel or bladder incontinence.  No sensory or motor changes in the lower limbs.    No changes or in the color or textures of the skin of the lower limbs.     Similar on the left side      Pain level without medication is 8/10 , with the medication pain level 6/10. Bc of the aggravation  usually pain is under 3 or 4 after injection    The pain meds are helping control the pain and improving Activities of Daily living and quality of life and quality of sleep.    opioids treatment agreement Jan 2024  Pill count today, using count tray, and in front of patient :  9 ER and 22 IR    pills , last fill was on 4/25  for 28 ER and 84 hydrocodone IR tabs,  the count is correct  Oarrs pulled and scanned in the chart  no concerns  last urine toxicology testing earlier this year and it was compliant we will repeat  Xray updated spine   ORT Score is  0  Pain pathology and pain generators spine   Modalities tried injection, surgery, physical therapy, TENS unit, nonsteroidal anti-inflammatory medication  fusion      Denied any fever or chills. No weight loss and no night sweats. No cough or sputum production. No diarrhea   The constipation has  been responding to fibers and over the counter medications.     No bladder and bowel incontinence and no other changes in bladder and bowel. No skin changes.  Reports tiredness and fatigability only if the pain is not controlled.   Denied opioids diversion and abuse and denies alcoholism. Denies overuse of the pain medications.    The control of the pain with the pain medications is helping the control of the symptoms and allowing the function and activities of daily living, enjoyment of life, improving the quality of life and sleep with less interruption by the pain. The goal is symptomatic control of the nonmalignant chronic pain and not to repair the permanent damage in the tissues inducing the chronic pain conditions. We are aiming to shift the focus from the nonmalignant chronic pain to other aspects of life by symptomatically treating this chronic pain. If this pain is not treated it will lead to major morbidity and it is also associated with increased risks of mortality. The patient understands those very clearly and also understand high risks of morbidity and mortality if not strictly adherent to the treatment recommendations and reporting any associated side effects. Also patient understand the full responsibility associated with these medications to avoid abuse or overuse or any use of these medications for anything besides treating the patient's own chronic pain and nothing else under any circumstances.        Physical examination  Awake, alert and oriented for time place and persons   declined Chaperone for the visit and was adequately  draped for the exam.    Examination of the lumbar spine and the SIJ area showed mild reversal of the lumbar lordosis with slight scoliosis with right-sided concavity.  The scoliosis slightly corrected upon bending of the lumbar spine.  Tight muscle bands over the right  lower lumbar area and over the upper buttock area.  Gaenslen and Peter are positive on the right  side. Also compression test of the right SIJ is positive increasing the pain.   Cardona test negative with no pain upon provocative testing of the lower facet joints  Straight leg raising was negative on both sides.  No sensorimotor deficits in the lower limbs.  Abdirahman testing were negative for axial loading and log rotation.  No aberrant pain behavior.      Similar on the left side    Diagnosis  Problem List Items Addressed This Visit       Ankylosis of lumbosacral joint    Relevant Medications    HYDROcodone-acetaminophen (Norco) 5-325 mg tablet (Start on 5/23/2024)    HYDROmorphone 12 mg tablet extended release 24 hr (Start on 5/23/2024)    nabumetone (Relafen) 500 mg tablet    traZODone (Desyrel) 50 mg tablet    lubiprostone (Amitiza) 24 mcg capsule    Bilateral sacroiliitis (CMS-HCC)    Relevant Medications    HYDROcodone-acetaminophen (Norco) 5-325 mg tablet (Start on 5/23/2024)    HYDROmorphone 12 mg tablet extended release 24 hr (Start on 5/23/2024)    nabumetone (Relafen) 500 mg tablet    traZODone (Desyrel) 50 mg tablet    lubiprostone (Amitiza) 24 mcg capsule    Other Relevant Orders    Sacroiliac Joint Injection    Closed fracture of four ribs    Relevant Medications    HYDROcodone-acetaminophen (Norco) 5-325 mg tablet (Start on 5/23/2024)    HYDROmorphone 12 mg tablet extended release 24 hr (Start on 5/23/2024)    nabumetone (Relafen) 500 mg tablet    traZODone (Desyrel) 50 mg tablet    lubiprostone (Amitiza) 24 mcg capsule    Closed fracture of sacrum (Multi)    Relevant Medications    HYDROcodone-acetaminophen (Norco) 5-325 mg tablet (Start on 5/23/2024)    HYDROmorphone 12 mg tablet extended release 24 hr (Start on 5/23/2024)    nabumetone (Relafen) 500 mg tablet    traZODone (Desyrel) 50 mg tablet    lubiprostone (Amitiza) 24 mcg capsule    Open traumatic subluxation of pelvis - Primary    Relevant Medications    HYDROcodone-acetaminophen (Norco) 5-325 mg tablet (Start on 5/23/2024)    HYDROmorphone  12 mg tablet extended release 24 hr (Start on 5/23/2024)    nabumetone (Relafen) 500 mg tablet    traZODone (Desyrel) 50 mg tablet    lubiprostone (Amitiza) 24 mcg capsule    Rupture of supraspinatus tendon    Relevant Medications    HYDROcodone-acetaminophen (Norco) 5-325 mg tablet (Start on 5/23/2024)    HYDROmorphone 12 mg tablet extended release 24 hr (Start on 5/23/2024)    nabumetone (Relafen) 500 mg tablet    traZODone (Desyrel) 50 mg tablet    lubiprostone (Amitiza) 24 mcg capsule        Plan  Reviewed the pain generators.  Went over the types of pain with neuropathic and nociceptive and different pathologies and therapeutic modalities. Discussed the mechanism of action of interventions from acupuncture, physical therapy , regular exercises, injections, botox, spinal cord stimulation, and role of surgery     Went over pathology of the intervertebral disc displacement and the anatomical relation to the Nerve roots and relation to the radicular symptoms. Went over treatment modalities with conservative treatment including acupuncture   and epidural steroid injection with fluoroscopy guidance and last resort of surgery    Based on the above findings and the clinical response to the opioids medications and improvement of the activities of daily living, sleep, and work performance. We made this complex decision to continue the opioids therapy in light of the evidence of the patient's responsibility in using the pain medications as prescribed for the nonmalignant chronic pain condition. We discussed about the use of the pain medications to treat the symptoms of chronic nonmalignant pain and we are not trying the repair the permanent damage in the tissues, rather we are trying to control the symptoms induced by the permanent damage to the tissues inducing the chronic pain condition and resulting disability. I explained the difference and discussed it with the patient and stressed the importance of knowing the  difference especially because of the potential side effects and the potential addicting effect and habit forming nature of the dangerous drugs we are using to treat the symptoms of the chronic pain.      We discussed that we are prescribing the medications on good leo and legitimate medical reason.     We reviewed the side effects and precautions of opioids prescriptions as discussed in the opioids treatment agreement.    realizes the interaction between the therapeutic classes including the respiratory depression and potential death     Random drug testing   we will submit     Given the agg of hte SIJ pain , consider intraarticular steroid injection those helped in the past, will request C9 from [facility] to repeat  Risks not limited to infection, sepsis, abscess, bleeding , nerve injury, paralysis, and death...     Discussed about NSAIDS and I explained about the opioids sparing effect to allow keeping the opioids dose at minimal effective dose.   I went over the potential side effects of the NSAIDS on the gastrointestinal, renal and cardiovascular systems.      Meds as prescribed above     I detailed the side effects from the acetaminophen in the medication and made aware of those. I also explained about the cumulative effects on the organs and mainly the liver.     Given the opioids therapy , we discussed about the risk for accidental over dose on the pain medications, either for patient or other household. I went over the mechanism of action and mode of use of the Naloxone according to the  recommendations. I will provide a prescription for a kit.     Follow-up 4 weeks or earlier if needed     The level of clinical decision making in this office visit,  is high, given the high risks of complications with the morbidity and mortality due to the fact that acute and chronic pain may pose a threat to life and bodily function, if under treated, poorly treated, or with failure to maintain adequate treatment  and timely medical follow up. Additionally over treatment has its own set of complications including overdosing on the pain medications and also the habit forming potentials with the use of the medications used to treat chronic painful conditions including therapeutic classes classified as dangerous medications. Given the serious and fluctuating nature of pain level and instensity with extensive consideration for whenever pain changes, there is always the risk of prolonged functional impairment requiring close patient monitoring with regular assessments and reassessments and high level medical decision making at every office visit. The amount and complexity of data reviewed is high given the patient clinical presentation, labs,  data, radiology reports, and other tests as discussed during office visits. Pertinent data whether positive or negative were taken in consideration in the process of making this high level medical decision.

## 2024-06-18 ENCOUNTER — APPOINTMENT (OUTPATIENT)
Dept: PAIN MEDICINE | Facility: CLINIC | Age: 64
End: 2024-06-18
Payer: COMMERCIAL

## 2024-06-18 DIAGNOSIS — S22.41XA CLOSED FRACTURE OF FOUR RIBS OF RIGHT SIDE, INITIAL ENCOUNTER: ICD-10-CM

## 2024-06-18 DIAGNOSIS — S31.000A OPEN TRAUMATIC SUBLUXATION OF PELVIS, INITIAL ENCOUNTER: ICD-10-CM

## 2024-06-18 DIAGNOSIS — M43.27 ANKYLOSIS OF LUMBOSACRAL JOINT: ICD-10-CM

## 2024-06-18 DIAGNOSIS — S33.30XA OPEN TRAUMATIC SUBLUXATION OF PELVIS, INITIAL ENCOUNTER: ICD-10-CM

## 2024-06-18 DIAGNOSIS — S46.011A RUPTURE OF RIGHT SUPRASPINATUS TENDON, INITIAL ENCOUNTER: ICD-10-CM

## 2024-06-18 DIAGNOSIS — S32.10XA CLOSED FRACTURE OF SACRUM, UNSPECIFIED PORTION OF SACRUM, INITIAL ENCOUNTER (MULTI): ICD-10-CM

## 2024-06-18 DIAGNOSIS — M46.1 BILATERAL SACROILIITIS (CMS-HCC): Primary | ICD-10-CM

## 2024-06-18 RX ORDER — TRAZODONE HYDROCHLORIDE 50 MG/1
50 TABLET ORAL NIGHTLY
Qty: 28 TABLET | Refills: 0 | Status: SHIPPED | OUTPATIENT
Start: 2024-06-18 | End: 2024-07-16

## 2024-06-18 RX ORDER — HYDROCODONE BITARTRATE AND ACETAMINOPHEN 5; 325 MG/1; MG/1
1 TABLET ORAL EVERY 8 HOURS PRN
Qty: 84 TABLET | Refills: 0 | Status: SHIPPED | OUTPATIENT
Start: 2024-06-25 | End: 2024-07-23

## 2024-06-18 RX ORDER — HYDROMORPHONE HYDROCHLORIDE 12 MG/1
1 TABLET, EXTENDED RELEASE ORAL DAILY
Qty: 28 TABLET | Refills: 0 | Status: SHIPPED | OUTPATIENT
Start: 2024-06-25 | End: 2024-07-23

## 2024-06-18 RX ORDER — NABUMETONE 500 MG/1
500 TABLET, FILM COATED ORAL 2 TIMES DAILY
Qty: 60 TABLET | Refills: 11 | Status: SHIPPED | OUTPATIENT
Start: 2024-06-18 | End: 2025-06-18

## 2024-06-18 NOTE — PROGRESS NOTES
[facility] [id number]  DOI 1.18.2007      · Rupture of supraspinatus tendon (840.4) (S46.019A)   · Open traumatic subluxation of pelvis, initial encounter (839.79) (S33.30XA,S31.000A)   · Bilateral sacroiliitis (720.2) (M46.1)   · Ankylosis of lumbosacral joint (724.6) (M43.27)      Chief complaint  Back pain     History  Jared Thomson is back for pain management office visit  Approved for back pain will approve  Pain in the r Sij. The pain in the back is deep on the right  side.  The pain is below the belt line, it is continuous but it gets worse with extension of the lumbar spine and with leading for the right leg.  It improves with leaning forward.  Sitting up increases the pain. Improves with laying on the side. There are no reported radiation of the pain to the lower limbs.     No bowel or bladder incontinence.  No sensory or motor changes in the lower limbs.    No changes or in the color or textures of the skin of the lower limbs.     Similar on the left  The R should is on and off      Pain level without medication is 8/10 , with the medication pain level 5 to 6/10. Bc of hte aggravation      The pain meds are helping control the pain and improving Activities of Daily living and quality of life and quality of sleep.    opioids treatment agreement Jan 2024     Oarrs pulled and reviewed, no concerns  last urine toxicology testing earlier this year and it was compliant we will repeat  Xray updated spine   ORT Score is  0  Pain pathology and pain generators spine and SIJ and shoulder   Modalities tried injection, surgery, physical therapy, TENS unit, nonsteroidal anti-inflammatory medication       Review of Systems :  Denied any fever or chills. No weight loss and no night sweats. No cough or sputum production. No diarrhea   The constipation has been responding to fibers and over the counter medications.     No bladder and bowel incontinence and no other changes in bladder and bowel. No skin changes.  Reports  tiredness and fatigability only if the pain is not controlled.   Denied opioids diversion and abuse and denies alcoholism. Denies overuse of the pain medications.    The control of the pain with the pain medications is helping the control of the symptoms and allowing the function and activities of daily living, enjoyment of life, improving the quality of life and sleep with less interruption by the pain. The goal is symptomatic control of the nonmalignant chronic pain and not to repair the permanent damage in the tissues inducing the chronic pain conditions. We are aiming to shift the focus from the nonmalignant chronic pain to other aspects of life by symptomatically treating this chronic pain. If this pain is not treated it will lead to major morbidity and it is also associated with increased risks of mortality. The patient understands those very clearly and also understand high risks of morbidity and mortality if not strictly adherent to the treatment recommendations and reporting any associated side effects. Also patient understand the full responsibility associated with these medications to avoid abuse or overuse or any use of these medications for anything besides treating the patient's own chronic pain and nothing else under any circumstances.        Physical examination  Awake, alert and oriented for time place and persons   declined Chaperone for the visit and was adequately  draped for the exam.    Examination of the lumbar spine and the SIJ area showed mild reversal of the lumbar lordosis with slight scoliosis with right-sided concavity.  The scoliosis slightly corrected upon bending of the lumbar spine.  Tight muscle bands over the right  lower lumbar area and over the upper buttock area.  Gaenslen and Peter are positive on the right side. Also compression test of the right SIJ is positive increasing the pain.   Cardona test negative with no pain upon provocative testing of the lower facet  joints  Straight leg raising was negative on both sides.  No sensorimotor deficits in the lower limbs.  Abdirahman testing were negative for axial loading and log rotation.  No aberrant pain behavior.      Similar exam on the left     Diagnosis  Problem List Items Addressed This Visit       Ankylosis of lumbosacral joint    Relevant Medications    HYDROcodone-acetaminophen (Norco) 5-325 mg tablet (Start on 6/25/2024)    HYDROmorphone 12 mg tablet extended release 24 hr (Start on 6/25/2024)    traZODone (Desyrel) 50 mg tablet    nabumetone (Relafen) 500 mg tablet    Bilateral sacroiliitis (CMS-HCC) - Primary    Relevant Medications    HYDROcodone-acetaminophen (Norco) 5-325 mg tablet (Start on 6/25/2024)    HYDROmorphone 12 mg tablet extended release 24 hr (Start on 6/25/2024)    traZODone (Desyrel) 50 mg tablet    nabumetone (Relafen) 500 mg tablet    Closed fracture of four ribs    Relevant Medications    HYDROcodone-acetaminophen (Norco) 5-325 mg tablet (Start on 6/25/2024)    HYDROmorphone 12 mg tablet extended release 24 hr (Start on 6/25/2024)    traZODone (Desyrel) 50 mg tablet    nabumetone (Relafen) 500 mg tablet    Closed fracture of sacrum (Multi)    Relevant Medications    HYDROcodone-acetaminophen (Norco) 5-325 mg tablet (Start on 6/25/2024)    HYDROmorphone 12 mg tablet extended release 24 hr (Start on 6/25/2024)    traZODone (Desyrel) 50 mg tablet    nabumetone (Relafen) 500 mg tablet    Open traumatic subluxation of pelvis    Relevant Medications    HYDROcodone-acetaminophen (Norco) 5-325 mg tablet (Start on 6/25/2024)    HYDROmorphone 12 mg tablet extended release 24 hr (Start on 6/25/2024)    traZODone (Desyrel) 50 mg tablet    nabumetone (Relafen) 500 mg tablet    Rupture of supraspinatus tendon    Relevant Medications    HYDROcodone-acetaminophen (Norco) 5-325 mg tablet (Start on 6/25/2024)    HYDROmorphone 12 mg tablet extended release 24 hr (Start on 6/25/2024)    traZODone (Desyrel) 50 mg tablet     nabumetone (Relafen) 500 mg tablet        Plan  Reviewed the pain generators.  Went over the types of pain with neuropathic and nociceptive and different pathologies and therapeutic modalities. Discussed the mechanism of action of interventions from acupuncture, physical therapy , regular exercises, injections, botox, spinal cord stimulation, and role of surgery     Went over pathology of the intervertebral disc displacement and the anatomical relation to the Nerve roots and relation to the radicular symptoms. Went over treatment modalities with conservative treatment including acupuncture   and epidural steroid injection with fluoroscopy guidance and last resort of surgery    Based on the above findings and the clinical response to the opioids medications and improvement of the activities of daily living, sleep, and work performance. We made this complex decision to continue the opioids therapy in light of the evidence of the patient's responsibility in using the pain medications as prescribed for the nonmalignant chronic pain condition. We discussed about the use of the pain medications to treat the symptoms of chronic nonmalignant pain and we are not trying the repair the permanent damage in the tissues, rather we are trying to control the symptoms induced by the permanent damage to the tissues inducing the chronic pain condition and resulting disability. I explained the difference and discussed it with the patient and stressed the importance of knowing the difference especially because of the potential side effects and the potential addicting effect and habit forming nature of the dangerous drugs we are using to treat the symptoms of the chronic pain.      We discussed that we are prescribing the medications on good leo and legitimate medical reason.     We reviewed the side effects and precautions of opioids prescriptions as discussed in the opioids treatment agreement.    realizes the interaction between  the therapeutic classes including the respiratory depression and potential death     Random drug testing   we will submit     Schedule the SIJ intraarticular steroid injection   Hydromorphone ER 12 once a day . Hydrocodone 5 TID   realizes the interaction between the therapeutic classes including the respiratory depression and potential death   has a narcan at home know how and when to use it if needed.     Discussed about NSAIDS and I explained about the opioids sparing effect to allow keeping the opioids dose at minimal effective dose.   I went over the potential side effects of the NSAIDS on the gastrointestinal, renal and cardiovascular systems.      I detailed the side effects from the acetaminophen in the medication and made aware of those. I also explained about the cumulative effects on the organs and mainly the liver.     Given the opioids therapy , we discussed about the risk for accidental over dose on the pain medications, either for patient or other household. I went over the mechanism of action and mode of use of the Naloxone according to the  recommendations. I will provide a prescription for a kit.     Follow-up 4 weeks or earlier if needed     The level of clinical decision making in this office visit,  is high, given the high risks of complications with the morbidity and mortality due to the fact that acute and chronic pain may pose a threat to life and bodily function, if under treated, poorly treated, or with failure to maintain adequate treatment and timely medical follow up. Additionally over treatment has its own set of complications including overdosing on the pain medications and also the habit forming potentials with the use of the medications used to treat chronic painful conditions including therapeutic classes classified as dangerous medications. Given the serious and fluctuating nature of pain level and instensity with extensive consideration for whenever pain changes, there  is always the risk of prolonged functional impairment requiring close patient monitoring with regular assessments and reassessments and high level medical decision making at every office visit. The amount and complexity of data reviewed is high given the patient clinical presentation, labs,  data, radiology reports, and other tests as discussed during office visits. Pertinent data whether positive or negative were taken in consideration in the process of making this high level medical decision.

## 2024-07-10 ENCOUNTER — HOSPITAL ENCOUNTER (OUTPATIENT)
Dept: OPERATING ROOM | Facility: CLINIC | Age: 64
Setting detail: OUTPATIENT SURGERY
Discharge: HOME | End: 2024-07-10
Payer: COMMERCIAL

## 2024-07-10 VITALS
SYSTOLIC BLOOD PRESSURE: 142 MMHG | OXYGEN SATURATION: 94 % | HEIGHT: 73 IN | RESPIRATION RATE: 16 BRPM | BODY MASS INDEX: 34.51 KG/M2 | WEIGHT: 260.36 LBS | DIASTOLIC BLOOD PRESSURE: 77 MMHG | TEMPERATURE: 97 F | HEART RATE: 74 BPM

## 2024-07-10 DIAGNOSIS — M46.1 BILATERAL SACROILIITIS (CMS-HCC): ICD-10-CM

## 2024-07-10 PROCEDURE — 2550000001 HC RX 255 CONTRASTS: Performed by: PHYSICAL MEDICINE & REHABILITATION

## 2024-07-10 PROCEDURE — 27096 INJECT SACROILIAC JOINT: CPT | Performed by: PHYSICAL MEDICINE & REHABILITATION

## 2024-07-10 PROCEDURE — 3600000006 HC OR TIME - EACH INCREMENTAL 1 MINUTE - PROCEDURE LEVEL ONE

## 2024-07-10 PROCEDURE — 3600000001 HC OR TIME - INITIAL BASE CHARGE - PROCEDURE LEVEL ONE

## 2024-07-10 PROCEDURE — 7100000010 HC PHASE TWO TIME - EACH INCREMENTAL 1 MINUTE

## 2024-07-10 PROCEDURE — 2500000005 HC RX 250 GENERAL PHARMACY W/O HCPCS: Performed by: PHYSICAL MEDICINE & REHABILITATION

## 2024-07-10 PROCEDURE — 2500000004 HC RX 250 GENERAL PHARMACY W/ HCPCS (ALT 636 FOR OP/ED): Performed by: PHYSICAL MEDICINE & REHABILITATION

## 2024-07-10 PROCEDURE — 7100000009 HC PHASE TWO TIME - INITIAL BASE CHARGE

## 2024-07-10 PROCEDURE — 99152 MOD SED SAME PHYS/QHP 5/>YRS: CPT | Performed by: PHYSICAL MEDICINE & REHABILITATION

## 2024-07-10 RX ORDER — LIDOCAINE HYDROCHLORIDE 5 MG/ML
INJECTION, SOLUTION INFILTRATION; INTRAVENOUS AS NEEDED
Status: COMPLETED | OUTPATIENT
Start: 2024-07-10 | End: 2024-07-10

## 2024-07-10 RX ORDER — TRIAMCINOLONE ACETONIDE 40 MG/ML
INJECTION, SUSPENSION INTRA-ARTICULAR; INTRAMUSCULAR AS NEEDED
Status: COMPLETED | OUTPATIENT
Start: 2024-07-10 | End: 2024-07-10

## 2024-07-10 RX ORDER — FENTANYL CITRATE 50 UG/ML
INJECTION, SOLUTION INTRAMUSCULAR; INTRAVENOUS AS NEEDED
Status: COMPLETED | OUTPATIENT
Start: 2024-07-10 | End: 2024-07-10

## 2024-07-10 ASSESSMENT — COLUMBIA-SUICIDE SEVERITY RATING SCALE - C-SSRS
6. HAVE YOU EVER DONE ANYTHING, STARTED TO DO ANYTHING, OR PREPARED TO DO ANYTHING TO END YOUR LIFE?: NO
2. HAVE YOU ACTUALLY HAD ANY THOUGHTS OF KILLING YOURSELF?: NO
1. IN THE PAST MONTH, HAVE YOU WISHED YOU WERE DEAD OR WISHED YOU COULD GO TO SLEEP AND NOT WAKE UP?: NO

## 2024-07-10 ASSESSMENT — PAIN - FUNCTIONAL ASSESSMENT
PAIN_FUNCTIONAL_ASSESSMENT: 0-10
PAIN_FUNCTIONAL_ASSESSMENT: 0-10

## 2024-07-10 ASSESSMENT — PAIN SCALES - GENERAL
PAINLEVEL_OUTOF10: 2
PAINLEVEL_OUTOF10: 8

## 2024-07-10 ASSESSMENT — PAIN DESCRIPTION - DESCRIPTORS: DESCRIPTORS: ACHING

## 2024-07-10 NOTE — POST-PROCEDURE NOTE
Bilateral sacro iliac joint intraarticular steroid injection with fluoroscopy     Time-in: 831 am   time-out: 844 am     Sedation: 75 mcg of IV fentanyl  Patient sedated but responsive to verbal commands. Monitoring of the vitals signs performed by qualified Registered Nurse during the entire procedure  Please see sedation record for sedation by RN.      Side:  bilateral     Indication: Failure to respond to conservative treatment with therapy and medications.     PROCEDURE:    The risks, benefits and alternatives of the procedure were discussed with the patient and agreed to proceed. The risks included but not limited to: infection, bleeding, paralysis, nerve injury sepsis and remotely death were discussed with the patient during the office and again in the pre procedure area.  The patient signed informed consent in the pre procedure area.     The patient was brought to procedure room and time out for the procedure was performed with the procedure room staff present.    Patient placed in prone position on the procedure table and draped and covered appropriately.      Fluoroscopy machine was used to identify the lumbo sacral area over the  Right SIJ    Skin was prepped and draped in sterile fashion over the SIJ area.  Skin was infiltrated with local anesthetic with lidocaien 0.5%    Spinal needle was to the lower end of the SIJ, tip of the needle position was verified with adequate flow of contrast dye 0.5cc of Isovue in the joint space.  At that point, 10 mg dexamethasone mixed with 1 mL of lidocaine 0.5% were injected.      Same procedure repeated on the left SIJ   with 40 mg of kenalog  mixed with 1 cc of lidocaine    solution  injected   the left sacro iliac joint      Procedure tolerated very well.  No complications encountered.  Post procedure care discussed with the patient, agreed to proceed.   Patient instructed on keeping track of the pain level after discharge.   Patient discharged home, from the recovery  room, in stable condition.

## 2024-07-18 ENCOUNTER — APPOINTMENT (OUTPATIENT)
Dept: PAIN MEDICINE | Facility: CLINIC | Age: 64
End: 2024-07-18
Payer: COMMERCIAL

## 2024-07-18 DIAGNOSIS — S22.41XA CLOSED FRACTURE OF FOUR RIBS OF RIGHT SIDE, INITIAL ENCOUNTER: ICD-10-CM

## 2024-07-18 DIAGNOSIS — M43.27 ANKYLOSIS OF LUMBOSACRAL JOINT: Primary | ICD-10-CM

## 2024-07-18 DIAGNOSIS — S31.000A OPEN TRAUMATIC SUBLUXATION OF PELVIS, INITIAL ENCOUNTER: ICD-10-CM

## 2024-07-18 DIAGNOSIS — S46.011A RUPTURE OF RIGHT SUPRASPINATUS TENDON, INITIAL ENCOUNTER: ICD-10-CM

## 2024-07-18 DIAGNOSIS — M46.1 BILATERAL SACROILIITIS (CMS-HCC): ICD-10-CM

## 2024-07-18 DIAGNOSIS — S33.30XA OPEN TRAUMATIC SUBLUXATION OF PELVIS, INITIAL ENCOUNTER: ICD-10-CM

## 2024-07-18 DIAGNOSIS — S32.10XA CLOSED FRACTURE OF SACRUM, UNSPECIFIED PORTION OF SACRUM, INITIAL ENCOUNTER (MULTI): ICD-10-CM

## 2024-07-18 PROCEDURE — 99214 OFFICE O/P EST MOD 30 MIN: CPT | Performed by: PHYSICAL MEDICINE & REHABILITATION

## 2024-07-18 RX ORDER — TRAZODONE HYDROCHLORIDE 50 MG/1
50 TABLET ORAL NIGHTLY
Qty: 28 TABLET | Refills: 0 | Status: SHIPPED | OUTPATIENT
Start: 2024-07-18 | End: 2024-08-15

## 2024-07-18 RX ORDER — HYDROMORPHONE HYDROCHLORIDE 12 MG/1
1 TABLET, EXTENDED RELEASE ORAL DAILY
Qty: 28 TABLET | Refills: 0 | Status: SHIPPED | OUTPATIENT
Start: 2024-07-25 | End: 2024-08-22

## 2024-07-18 RX ORDER — HYDROCODONE BITARTRATE AND ACETAMINOPHEN 5; 325 MG/1; MG/1
1 TABLET ORAL EVERY 8 HOURS PRN
Qty: 84 TABLET | Refills: 0 | Status: SHIPPED | OUTPATIENT
Start: 2024-07-25 | End: 2024-08-22

## 2024-07-18 RX ORDER — NABUMETONE 500 MG/1
500 TABLET, FILM COATED ORAL 2 TIMES DAILY
Qty: 60 TABLET | Refills: 11 | Status: SHIPPED | OUTPATIENT
Start: 2024-07-18 | End: 2025-07-18

## 2024-07-18 RX ORDER — LUBIPROSTONE 24 UG/1
24 CAPSULE ORAL 2 TIMES DAILY
Qty: 56 CAPSULE | Refills: 0 | Status: SHIPPED | OUTPATIENT
Start: 2024-07-18 | End: 2024-08-15

## 2024-07-18 NOTE — PROGRESS NOTES
[facility] [id number]  DOI 1.18.2007      · Rupture of supraspinatus tendon (840.4) (S46.019A)   · Open traumatic subluxation of pelvis, initial encounter (839.79) (S33.30XA,S31.000A)   · Bilateral sacroiliitis (720.2) (M46.1)   · Ankylosis of lumbosacral joint (724.6) (M43.27)      Chief complaint  Back pain   R shoulder pain     History  Jared VALLADARES Thomson is back for pain management office visit  The injection helped with the pain control and now the pain is improved and able to control the pain with the meds   Pain now controlled with meds  The pain in the back is deep achy worse in the mid back area.  This is associated with tight muscle bands.  This limits the range of motion of the lumbar spine mainly in forward flexion.  The pain in the back is radiating around the side on the lateral aspect of the   thighs going down toward the lateral aspect of the legs into the lateral malleosli toward the lateral aspect of the feet towards the big toes.       Long discussion about putting effort in cutting back on pain medications. Discussed about pain level changing and we do not know if the medications at this amount are still needed until we try and cut back slowly on pain medications. If the cut is tolerated then we continue. If cut not tolerated then, will go back on the pain medications level.  The goal from this is to keep the pain medications at the lowest effective dose.         Pain level without medication is 8/10 , with the medication pain level 3/10.     The pain meds are helping control the pain and improving Activities of Daily living and quality of life and quality of sleep.    opioids treatment agreement Jan 2024  Pill count today, using count tray, and in front of patient :  9 hydromorphone and 24 hydrocodone    pills , last fill was on 6/27  for 28 hydromorphone and 84 hydrocdone tabs,  the count is correct  Oarrs pulled and reviewed, no concerns  last urine toxicology testing earlier this year and it was  "compliant we will repeat  Xray updated spine   ORT Score is  0  Pain pathology and pain generators spine   Modalities tried injection, surgery, physical therapy, TENS unit, nonsteroidal anti-inflammatory medication       Review of Systems :  Denied any fever or chills. No weight loss and no night sweats. No cough or sputum production. No diarrhea   The constipation has been responding to fibers and over the counter medications.     No bladder and bowel incontinence and no other changes in bladder and bowel. No skin changes.  Reports tiredness and fatigability only if the pain is not controlled.     Denied opioids diversion and abuse and denies alcoholism. Denies overuse of the pain medications.  No reported euphoria sensation or getting a \"high\" on the pain medications.    The control of the pain with the pain medications is helping the control of the symptoms and allowing the function and activities of daily living, enjoyment of life, improving the quality of life and sleep with less interruption by the pain. The goal is symptomatic control of the nonmalignant chronic pain and not to repair the permanent damage in the tissues inducing the chronic pain conditions. We are aiming to shift the focus from the nonmalignant chronic pain to other aspects of life by symptomatically treating this chronic pain. If this pain is not treated it will lead to major morbidity and it is also associated with increased risks of mortality. The patient understands those very clearly and also understand high risks of morbidity and mortality if not strictly adherent to the treatment recommendations and reporting any associated side effects. Also patient understand the full responsibility associated with these medications to avoid abuse or overuse or any use of these medications for anything besides treating the patient's own chronic pain and nothing else under any circumstances.        Physical examination  Awake, alert and oriented for " time place and persons   declined Chaperone for the visit and was adequately  draped for the exam.    Abdirahman negative  widened perimeter of stance   plantar cutaneous reflex are down going bilaterally     Diagnosis  Problem List Items Addressed This Visit       Ankylosis of lumbosacral joint - Primary    Relevant Medications    lubiprostone (Amitiza) 24 mcg capsule    traZODone (Desyrel) 50 mg tablet    HYDROcodone-acetaminophen (Norco) 5-325 mg tablet (Start on 7/25/2024)    HYDROmorphone 12 mg tablet extended release 24 hr (Start on 7/25/2024)    nabumetone (Relafen) 500 mg tablet    Bilateral sacroiliitis (CMS-HCC)    Relevant Medications    lubiprostone (Amitiza) 24 mcg capsule    traZODone (Desyrel) 50 mg tablet    HYDROcodone-acetaminophen (Norco) 5-325 mg tablet (Start on 7/25/2024)    HYDROmorphone 12 mg tablet extended release 24 hr (Start on 7/25/2024)    nabumetone (Relafen) 500 mg tablet    Closed fracture of four ribs    Relevant Medications    lubiprostone (Amitiza) 24 mcg capsule    traZODone (Desyrel) 50 mg tablet    HYDROcodone-acetaminophen (Norco) 5-325 mg tablet (Start on 7/25/2024)    HYDROmorphone 12 mg tablet extended release 24 hr (Start on 7/25/2024)    nabumetone (Relafen) 500 mg tablet    Closed fracture of sacrum (Multi)    Relevant Medications    lubiprostone (Amitiza) 24 mcg capsule    traZODone (Desyrel) 50 mg tablet    HYDROcodone-acetaminophen (Norco) 5-325 mg tablet (Start on 7/25/2024)    HYDROmorphone 12 mg tablet extended release 24 hr (Start on 7/25/2024)    nabumetone (Relafen) 500 mg tablet    Open traumatic subluxation of pelvis    Relevant Medications    lubiprostone (Amitiza) 24 mcg capsule    traZODone (Desyrel) 50 mg tablet    HYDROcodone-acetaminophen (Norco) 5-325 mg tablet (Start on 7/25/2024)    HYDROmorphone 12 mg tablet extended release 24 hr (Start on 7/25/2024)    nabumetone (Relafen) 500 mg tablet    Rupture of supraspinatus tendon    Relevant Medications     lubiprostone (Amitiza) 24 mcg capsule    traZODone (Desyrel) 50 mg tablet    HYDROcodone-acetaminophen (Norco) 5-325 mg tablet (Start on 7/25/2024)    HYDROmorphone 12 mg tablet extended release 24 hr (Start on 7/25/2024)    nabumetone (Relafen) 500 mg tablet        Plan  Reviewed the pain generators.  Went over the types of pain with neuropathic and nociceptive and different pathologies and therapeutic modalities. Discussed the mechanism of action of interventions from acupuncture, physical therapy , regular exercises, injections, botox, spinal cord stimulation, and role of surgery     Went over pathology of the intervertebral disc displacement and the anatomical relation to the Nerve roots and relation to the radicular symptoms. Went over treatment modalities with conservative treatment including acupuncture   and epidural steroid injection with fluoroscopy guidance and last resort of surgery    Based on the above findings and the clinical response to the opioids medications and improvement of the activities of daily living, sleep, and work performance. We made this complex decision to continue the opioids therapy in light of the evidence of the patient's responsibility in using the pain medications as prescribed for the nonmalignant chronic pain condition. We discussed about the use of the pain medications to treat the symptoms of chronic nonmalignant pain and we are not trying the repair the permanent damage in the tissues, rather we are trying to control the symptoms induced by the permanent damage to the tissues inducing the chronic pain condition and resulting disability. I explained the difference and discussed it with the patient and stressed the importance of knowing the difference especially because of the potential side effects and the potential addicting effect and habit forming nature of the dangerous drugs we are using to treat the symptoms of the chronic pain.      We discussed that we are  prescribing the medications on good leo and legitimate medical reason.     We reviewed the side effects and precautions of opioids prescriptions as discussed in the opioids treatment agreement.    realizes the interaction between the therapeutic classes including the respiratory depression and potential death     Random drug testing   we will submit     Hold off on injectoins since pain controlled  Long discussion about putting effort in cutting back on pain medications. Discussed about pain level changing and we do not know if the medications at this amount are still needed until we try and cut back slowly on pain medications. If the cut is tolerated then we continue. If cut not tolerated then, will go back on the pain medications level.  The goal from this is to keep the pain medications at the lowest effective dose.     Discussed about NSAIDS and I explained about the opioids sparing effect to allow keeping the opioids dose at minimal effective dose.   I went over the potential side effects of the NSAIDS on the gastrointestinal, renal and cardiovascular systems.      I detailed the side effects from the acetaminophen in the medication and made aware of those. I also explained about the cumulative effects on the organs and mainly the liver.     Given the opioids therapy , we discussed about the risk for accidental over dose on the pain medications, either for patient or other household. I went over the mechanism of action and mode of use of the Naloxone according to the  recommendations. I will provide a prescription for a kit.     Follow-up 4 weeks or earlier if needed     The level of clinical decision making in this office visit,  is high, given the high risks of complications with the morbidity and mortality due to the fact that acute and chronic pain may pose a threat to life and bodily function, if under treated, poorly treated, or with failure to maintain adequate treatment and timely medical  follow up. Additionally over treatment has its own set of complications including overdosing on the pain medications and also the habit forming potentials with the use of the medications used to treat chronic painful conditions including therapeutic classes classified as dangerous medications. Given the serious and fluctuating nature of pain level and instensity with extensive consideration for whenever pain changes, there is always the risk of prolonged functional impairment requiring close patient monitoring with regular assessments and reassessments and high level medical decision making at every office visit. The amount and complexity of data reviewed is high given the patient clinical presentation, labs,  data, radiology reports, and other tests as discussed during office visits. Pertinent data whether positive or negative were taken in consideration in the process of making this high level medical decision.

## 2024-08-20 ENCOUNTER — APPOINTMENT (OUTPATIENT)
Dept: PAIN MEDICINE | Facility: CLINIC | Age: 64
End: 2024-08-20
Payer: COMMERCIAL

## 2024-08-20 DIAGNOSIS — S22.41XA CLOSED FRACTURE OF FOUR RIBS OF RIGHT SIDE, INITIAL ENCOUNTER: ICD-10-CM

## 2024-08-20 DIAGNOSIS — M46.1 BILATERAL SACROILIITIS (CMS-HCC): ICD-10-CM

## 2024-08-20 DIAGNOSIS — S33.30XA OPEN TRAUMATIC SUBLUXATION OF PELVIS, INITIAL ENCOUNTER: Primary | ICD-10-CM

## 2024-08-20 DIAGNOSIS — S32.10XA CLOSED FRACTURE OF SACRUM, UNSPECIFIED PORTION OF SACRUM, INITIAL ENCOUNTER (MULTI): ICD-10-CM

## 2024-08-20 DIAGNOSIS — M43.27 ANKYLOSIS OF LUMBOSACRAL JOINT: ICD-10-CM

## 2024-08-20 DIAGNOSIS — S31.000A OPEN TRAUMATIC SUBLUXATION OF PELVIS, INITIAL ENCOUNTER: Primary | ICD-10-CM

## 2024-08-20 DIAGNOSIS — S46.011A RUPTURE OF RIGHT SUPRASPINATUS TENDON, INITIAL ENCOUNTER: ICD-10-CM

## 2024-08-20 PROCEDURE — 99214 OFFICE O/P EST MOD 30 MIN: CPT | Performed by: PHYSICAL MEDICINE & REHABILITATION

## 2024-08-20 RX ORDER — HYDROMORPHONE HYDROCHLORIDE 12 MG/1
1 TABLET, EXTENDED RELEASE ORAL DAILY
Qty: 28 TABLET | Refills: 0 | Status: SHIPPED | OUTPATIENT
Start: 2024-08-22 | End: 2024-09-19

## 2024-08-20 RX ORDER — LUBIPROSTONE 24 UG/1
24 CAPSULE ORAL 2 TIMES DAILY
Qty: 56 CAPSULE | Refills: 3 | Status: SHIPPED | OUTPATIENT
Start: 2024-08-20 | End: 2024-09-17

## 2024-08-20 RX ORDER — TRAZODONE HYDROCHLORIDE 50 MG/1
50 TABLET ORAL NIGHTLY
Qty: 28 TABLET | Refills: 0 | Status: SHIPPED | OUTPATIENT
Start: 2024-08-20 | End: 2024-09-17

## 2024-08-20 RX ORDER — HYDROCODONE BITARTRATE AND ACETAMINOPHEN 5; 325 MG/1; MG/1
1 TABLET ORAL EVERY 8 HOURS PRN
Qty: 84 TABLET | Refills: 0 | Status: SHIPPED | OUTPATIENT
Start: 2024-08-22 | End: 2024-09-19

## 2024-08-20 RX ORDER — NABUMETONE 500 MG/1
500 TABLET, FILM COATED ORAL 2 TIMES DAILY
Qty: 60 TABLET | Refills: 11 | Status: SHIPPED | OUTPATIENT
Start: 2024-08-20 | End: 2025-08-20

## 2024-08-20 NOTE — PROGRESS NOTES
[facility] [id number]  DOI 1.18.2007      · Rupture of supraspinatus tendon (840.4) (S46.019A)   · Open traumatic subluxation of pelvis, initial encounter (839.79) (S33.30XA,S31.000A)   · Bilateral sacroiliitis (720.2) (M46.1)   · Ankylosis of lumbosacral joint (724.6) (M43.27)      Chief complaint  Back pain     History  Jared Thomson is back for pain management office visit  Having cough. Jared Thomson was at home.  Could not physically come to the office because of upper respiratory tract symptoms that are being worked up.  I was at the office.  I used secure audiovisual communication provided by the Epic system. Jared Thomson  agreed on this form of communication and consented to the visit via A/V method.  The patient also understood that this will be billed as office visit.    Cotinue with back pain   Discussion about Long discussion about putting effort in cutting back on pain medications. Discussed about pain level changing and we do not know if the medications at this amount are still needed until we try and cut back slowly on pain medications. If the cut is tolerated then we continue. If cut not tolerated then, will go back on the pain medications level.  The goal from this is to keep the pain medications at the lowest effective dose.     Back pain of similar characteristics      Pain level without medication is 8/10 , with the medication pain level 3/10.     The pain meds are helping control the pain and improving Activities of Daily living and quality of life and quality of sleep.    opioids treatment agreement Jan 2024  He is on schedule with meds   Oarrs pulled and reviewed, no concerns  last urine toxicology testing earlier this year and it was compliant we will repeat  Xray updated spine   ORT Score is  0  Pain pathology and pain generators spine   Modalities tried injection, surgery, physical therapy, TENS unit, nonsteroidal anti-inflammatory medication       Review of Systems :  Denied any  "fever or chills. No weight loss and no night sweats. No cough or sputum production. No diarrhea   The constipation has not been responding to fibers and over the counter medications. And the amitiza was working for OI, will need PA for the Amitiza    No bladder and bowel incontinence and no other changes in bladder and bowel. No skin changes.  Reports tiredness and fatigability only if the pain is not controlled.     Denied opioids diversion and abuse and denies alcoholism. Denies overuse of the pain medications.  No reported euphoria sensation or getting a \"high\" on the pain medications.    The control of the pain with the pain medications is helping the control of the symptoms and allowing the function and activities of daily living, enjoyment of life, improving the quality of life and sleep with less interruption by the pain. The goal is symptomatic control of the nonmalignant chronic pain and not to repair the permanent damage in the tissues inducing the chronic pain conditions. We are aiming to shift the focus from the nonmalignant chronic pain to other aspects of life by symptomatically treating this chronic pain. If this pain is not treated it will lead to major morbidity and it is also associated with increased risks of mortality. The patient understands those very clearly and also understand high risks of morbidity and mortality if not strictly adherent to the treatment recommendations and reporting any associated side effects. Also patient understand the full responsibility associated with these medications to avoid abuse or overuse or any use of these medications for anything besides treating the patient's own chronic pain and nothing else under any circumstances.        Physical examination  Awake, alert and oriented for time place and persons   This is a secure AV visit      Diagnosis  Problem List Items Addressed This Visit       Ankylosis of lumbosacral joint    Relevant Medications    HYDROmorphone " 12 mg tablet extended release 24 hr (Start on 8/22/2024)    HYDROcodone-acetaminophen (Norco) 5-325 mg tablet (Start on 8/22/2024)    lubiprostone (Amitiza) 24 mcg capsule    nabumetone (Relafen) 500 mg tablet    traZODone (Desyrel) 50 mg tablet    Bilateral sacroiliitis (CMS-HCC)    Relevant Medications    HYDROmorphone 12 mg tablet extended release 24 hr (Start on 8/22/2024)    HYDROcodone-acetaminophen (Norco) 5-325 mg tablet (Start on 8/22/2024)    lubiprostone (Amitiza) 24 mcg capsule    nabumetone (Relafen) 500 mg tablet    traZODone (Desyrel) 50 mg tablet    Closed fracture of four ribs    Relevant Medications    HYDROmorphone 12 mg tablet extended release 24 hr (Start on 8/22/2024)    HYDROcodone-acetaminophen (Norco) 5-325 mg tablet (Start on 8/22/2024)    lubiprostone (Amitiza) 24 mcg capsule    nabumetone (Relafen) 500 mg tablet    traZODone (Desyrel) 50 mg tablet    Closed fracture of sacrum (Multi)    Relevant Medications    HYDROmorphone 12 mg tablet extended release 24 hr (Start on 8/22/2024)    HYDROcodone-acetaminophen (Norco) 5-325 mg tablet (Start on 8/22/2024)    lubiprostone (Amitiza) 24 mcg capsule    nabumetone (Relafen) 500 mg tablet    traZODone (Desyrel) 50 mg tablet    Open traumatic subluxation of pelvis - Primary    Relevant Medications    HYDROmorphone 12 mg tablet extended release 24 hr (Start on 8/22/2024)    HYDROcodone-acetaminophen (Norco) 5-325 mg tablet (Start on 8/22/2024)    lubiprostone (Amitiza) 24 mcg capsule    nabumetone (Relafen) 500 mg tablet    traZODone (Desyrel) 50 mg tablet    Rupture of supraspinatus tendon    Relevant Medications    HYDROmorphone 12 mg tablet extended release 24 hr (Start on 8/22/2024)    HYDROcodone-acetaminophen (Norco) 5-325 mg tablet (Start on 8/22/2024)    lubiprostone (Amitiza) 24 mcg capsule    nabumetone (Relafen) 500 mg tablet    traZODone (Desyrel) 50 mg tablet        Plan  Reviewed the pain generators.  Went over the types of pain with  neuropathic and nociceptive and different pathologies and therapeutic modalities. Discussed the mechanism of action of interventions from acupuncture, physical therapy , regular exercises, injections, botox, spinal cord stimulation, and role of surgery     Went over pathology of the intervertebral disc displacement and the anatomical relation to the Nerve roots and relation to the radicular symptoms. Went over treatment modalities with conservative treatment including acupuncture   and epidural steroid injection with fluoroscopy guidance and last resort of surgery    Based on the above findings and the clinical response to the opioids medications and improvement of the activities of daily living, sleep, and work performance. We made this complex decision to continue the opioids therapy in light of the evidence of the patient's responsibility in using the pain medications as prescribed for the nonmalignant chronic pain condition. We discussed about the use of the pain medications to treat the symptoms of chronic nonmalignant pain and we are not trying the repair the permanent damage in the tissues, rather we are trying to control the symptoms induced by the permanent damage to the tissues inducing the chronic pain condition and resulting disability. I explained the difference and discussed it with the patient and stressed the importance of knowing the difference especially because of the potential side effects and the potential addicting effect and habit forming nature of the dangerous drugs we are using to treat the symptoms of the chronic pain.      We discussed that we are prescribing the medications on good leo and legitimate medical reason.     We reviewed the side effects and precautions of opioids prescriptions as discussed in the opioids treatment agreement.    realizes the interaction between the therapeutic classes including the respiratory depression and potential death     Random drug testing   we will  submit     Again Long discussion about putting effort in cutting back on pain medications. Discussed about pain level changing and we do not know if the medications at this amount are still needed until we try and cut back slowly on pain medications. If the cut is tolerated then we continue. If cut not tolerated then, will go back on the pain medications level.  The goal from this is to keep the pain medications at the lowest effective dose.   Consider repeat injection for aggravation      Discussed about NSAIDS and I explained about the opioids sparing effect to allow keeping the opioids dose at minimal effective dose.   I went over the potential side effects of the NSAIDS on the gastrointestinal, renal and cardiovascular systems.      I detailed the side effects from the acetaminophen in the medication and made aware of those. I also explained about the cumulative effects on the organs and mainly the liver.     Given the opioids therapy , we discussed about the risk for accidental over dose on the pain medications, either for patient or other household. I went over the mechanism of action and mode of use of the Naloxone according to the  recommendations. I will provide a prescription for a kit.     Follow-up 8 weeks or earlier if needed     The level of clinical decision making in this office visit,  is high, given the high risks of complications with the morbidity and mortality due to the fact that acute and chronic pain may pose a threat to life and bodily function, if under treated, poorly treated, or with failure to maintain adequate treatment and timely medical follow up. Additionally over treatment has its own set of complications including overdosing on the pain medications and also the habit forming potentials with the use of the medications used to treat chronic painful conditions including therapeutic classes classified as dangerous medications. Given the serious and fluctuating nature of  pain level and instensity with extensive consideration for whenever pain changes, there is always the risk of prolonged functional impairment requiring close patient monitoring with regular assessments and reassessments and high level medical decision making at every office visit. The amount and complexity of data reviewed is high given the patient clinical presentation, labs,  data, radiology reports, and other tests as discussed during office visits. Pertinent data whether positive or negative were taken in consideration in the process of making this high level medical decision.

## 2024-09-17 ENCOUNTER — OFFICE VISIT (OUTPATIENT)
Dept: PAIN MEDICINE | Facility: CLINIC | Age: 64
End: 2024-09-17
Payer: COMMERCIAL

## 2024-09-17 DIAGNOSIS — M46.1 BILATERAL SACROILIITIS (CMS-HCC): ICD-10-CM

## 2024-09-17 DIAGNOSIS — S46.011A RUPTURE OF RIGHT SUPRASPINATUS TENDON, INITIAL ENCOUNTER: ICD-10-CM

## 2024-09-17 DIAGNOSIS — S31.000A OPEN TRAUMATIC SUBLUXATION OF PELVIS, INITIAL ENCOUNTER: ICD-10-CM

## 2024-09-17 DIAGNOSIS — S33.30XA OPEN TRAUMATIC SUBLUXATION OF PELVIS, INITIAL ENCOUNTER: ICD-10-CM

## 2024-09-17 DIAGNOSIS — S22.41XA CLOSED FRACTURE OF FOUR RIBS OF RIGHT SIDE, INITIAL ENCOUNTER: ICD-10-CM

## 2024-09-17 DIAGNOSIS — M43.27 ANKYLOSIS OF LUMBOSACRAL JOINT: Primary | ICD-10-CM

## 2024-09-17 DIAGNOSIS — S32.10XA CLOSED FRACTURE OF SACRUM, UNSPECIFIED PORTION OF SACRUM, INITIAL ENCOUNTER (MULTI): ICD-10-CM

## 2024-09-17 RX ORDER — HYDROMORPHONE HYDROCHLORIDE 12 MG/1
1 TABLET, EXTENDED RELEASE ORAL DAILY
Qty: 28 TABLET | Refills: 0 | Status: SHIPPED | OUTPATIENT
Start: 2024-09-19 | End: 2024-10-17

## 2024-09-17 RX ORDER — LUBIPROSTONE 24 UG/1
24 CAPSULE ORAL 2 TIMES DAILY
Qty: 56 CAPSULE | Refills: 3 | Status: SHIPPED | OUTPATIENT
Start: 2024-09-17 | End: 2024-10-15

## 2024-09-17 RX ORDER — NABUMETONE 500 MG/1
500 TABLET, FILM COATED ORAL 2 TIMES DAILY
Qty: 60 TABLET | Refills: 11 | Status: SHIPPED | OUTPATIENT
Start: 2024-09-17 | End: 2025-09-17

## 2024-09-17 RX ORDER — TRAZODONE HYDROCHLORIDE 50 MG/1
50 TABLET ORAL NIGHTLY
Qty: 28 TABLET | Refills: 0 | Status: SHIPPED | OUTPATIENT
Start: 2024-09-17 | End: 2024-10-15

## 2024-09-17 RX ORDER — HYDROCODONE BITARTRATE AND ACETAMINOPHEN 5; 325 MG/1; MG/1
1 TABLET ORAL EVERY 8 HOURS PRN
Qty: 84 TABLET | Refills: 0 | Status: SHIPPED | OUTPATIENT
Start: 2024-09-19 | End: 2024-10-17

## 2024-09-17 NOTE — PROGRESS NOTES
[facility] [id number]  DOI 1.18.2007      · Rupture of supraspinatus tendon (840.4) (S46.019A)   · Open traumatic subluxation of pelvis, initial encounter (839.79) (S33.30XA,S31.000A)   · Bilateral sacroiliitis (720.2) (M46.1)   · Ankylosis of lumbosacral joint (724.6) (M43.27)      Chief complaint  R shoulder and back pain     History  Jared Thomson is back for pain management office visit  The SIJ pain is better after the intraarticular steroid injection   He is doing better  Still having some pain in the R shoulder and intermittent residual pain over the lower back over the SIJ  Pain over the lower back across the waist line  No radicular symptoms    Today again Long discussion about putting effort in cutting back on pain medications. Discussed about pain level changing and we do not know if the medications at this amount are still needed until we try and cut back slowly on pain medications. If the cut is tolerated then we continue. If cut not tolerated then, will go back on the pain medications level.  The goal from this is to keep the pain medications at the lowest effective dose.  No bowel and bladder incontinence       Pain level without medication is 8/10 , with the medication pain level 2/10.     The pain meds are helping control the pain and improving Activities of Daily living and quality of life and quality of sleep.    opioids treatment agreement Jan 2024  Pill count today, using count tray, and in front of patient :  5 hydromorphone and 17 hydrocodone    pills , last fill was on 8/22  for 5 hydromorphone and 17 hydrocodone tabs,  the count is correct  Oarrs pulled and reviewed, no concerns  last urine toxicology testing earlier this year and it was compliant we will repeat  Xray updated spine   ORT Score is  0  Pain pathology and pain generators spine   Modalities tried injection, surgery, physical therapy, TENS unit, nonsteroidal anti-inflammatory medication       Review of Systems :  Denied any  "fever or chills. No weight loss and no night sweats. No cough or sputum production. No diarrhea   The constipation has not been responding to fibers and over the counter medications. Usually Amitiza helps but [facility] is not approving. Will request PA    No bladder and bowel incontinence and no other changes in bladder and bowel. No skin changes.  Reports tiredness and fatigability only if the pain is not controlled.     Denied opioids diversion and abuse and denies alcoholism. Denies overuse of the pain medications.  No reported euphoria sensation or getting a \"high\" on the pain medications.    The control of the pain with the pain medications is helping the control of the symptoms and allowing the function and activities of daily living, enjoyment of life, improving the quality of life and sleep with less interruption by the pain. The goal is symptomatic control of the nonmalignant chronic pain and not to repair the permanent damage in the tissues inducing the chronic pain conditions. We are aiming to shift the focus from the nonmalignant chronic pain to other aspects of life by symptomatically treating this chronic pain. If this pain is not treated it will lead to major morbidity and it is also associated with increased risks of mortality. The patient understands those very clearly and also understand high risks of morbidity and mortality if not strictly adherent to the treatment recommendations and reporting any associated side effects. Also patient understand the full responsibility associated with these medications to avoid abuse or overuse or any use of these medications for anything besides treating the patient's own chronic pain and nothing else under any circumstances.        Physical examination  Awake, alert and oriented for time place and persons   declined Chaperone for the visit and was adequately  draped for the exam.  SIJ loading is equivocal  SLR negative   Pain inhibition of the hips Manual Muscle " strength testing because of the pain at the lower back of and over SIJ. the endurance is decreased even though the initial resistance was 5/5 but could not keep beyond initial resistance.   plantar cutaneous reflex are down going bilaterally     Diagnosis  Problem List Items Addressed This Visit       Ankylosis of lumbosacral joint - Primary    Relevant Medications    nabumetone (Relafen) 500 mg tablet    HYDROmorphone 12 mg tablet extended release 24 hr (Start on 9/19/2024)    HYDROcodone-acetaminophen (Norco) 5-325 mg tablet (Start on 9/19/2024)    traZODone (Desyrel) 50 mg tablet    lubiprostone (Amitiza) 24 mcg capsule    Bilateral sacroiliitis (CMS-HCC)    Relevant Medications    nabumetone (Relafen) 500 mg tablet    HYDROmorphone 12 mg tablet extended release 24 hr (Start on 9/19/2024)    HYDROcodone-acetaminophen (Norco) 5-325 mg tablet (Start on 9/19/2024)    traZODone (Desyrel) 50 mg tablet    lubiprostone (Amitiza) 24 mcg capsule    Closed fracture of four ribs    Relevant Medications    nabumetone (Relafen) 500 mg tablet    HYDROmorphone 12 mg tablet extended release 24 hr (Start on 9/19/2024)    HYDROcodone-acetaminophen (Norco) 5-325 mg tablet (Start on 9/19/2024)    traZODone (Desyrel) 50 mg tablet    lubiprostone (Amitiza) 24 mcg capsule    Closed fracture of sacrum (Multi)    Relevant Medications    nabumetone (Relafen) 500 mg tablet    HYDROmorphone 12 mg tablet extended release 24 hr (Start on 9/19/2024)    HYDROcodone-acetaminophen (Norco) 5-325 mg tablet (Start on 9/19/2024)    traZODone (Desyrel) 50 mg tablet    lubiprostone (Amitiza) 24 mcg capsule    Open traumatic subluxation of pelvis    Relevant Medications    nabumetone (Relafen) 500 mg tablet    HYDROmorphone 12 mg tablet extended release 24 hr (Start on 9/19/2024)    HYDROcodone-acetaminophen (Norco) 5-325 mg tablet (Start on 9/19/2024)    traZODone (Desyrel) 50 mg tablet    lubiprostone (Amitiza) 24 mcg capsule    Rupture of  supraspinatus tendon    Relevant Medications    nabumetone (Relafen) 500 mg tablet    HYDROmorphone 12 mg tablet extended release 24 hr (Start on 9/19/2024)    HYDROcodone-acetaminophen (Norco) 5-325 mg tablet (Start on 9/19/2024)    traZODone (Desyrel) 50 mg tablet    lubiprostone (Amitiza) 24 mcg capsule        Plan  Reviewed the pain generators.  Went over the types of pain with neuropathic and nociceptive and different pathologies and therapeutic modalities. Discussed the mechanism of action of interventions from acupuncture, physical therapy , regular exercises, injections, botox, spinal cord stimulation, and role of surgery     Went over pathology of the intervertebral disc displacement and the anatomical relation to the Nerve roots and relation to the radicular symptoms. Went over treatment modalities with conservative treatment including acupuncture   and epidural steroid injection with fluoroscopy guidance and last resort of surgery    Based on the above findings and the clinical response to the opioids medications and improvement of the activities of daily living, sleep, and work performance. We made this complex decision to continue the opioids therapy in light of the evidence of the patient's responsibility in using the pain medications as prescribed for the nonmalignant chronic pain condition. We discussed about the use of the pain medications to treat the symptoms of chronic nonmalignant pain and we are not trying the repair the permanent damage in the tissues, rather we are trying to control the symptoms induced by the permanent damage to the tissues inducing the chronic pain condition and resulting disability. I explained the difference and discussed it with the patient and stressed the importance of knowing the difference especially because of the potential side effects and the potential addicting effect and habit forming nature of the dangerous drugs we are using to treat the symptoms of the  chronic pain.      We discussed that we are prescribing the medications on good leo and legitimate medical reason.     We reviewed the side effects and precautions of opioids prescriptions as discussed in the opioids treatment agreement.    realizes the interaction between the therapeutic classes including the respiratory depression and potential death     Random drug testing   we will submit     Consider SCS or PNS for chronic pain   Continue wthi pain meds for baseline pain control  realizes the interaction between the therapeutic classes including the respiratory depression and potential death   has a narcan at home know how and when to use it if needed.     Discussed about NSAIDS and I explained about the opioids sparing effect to allow keeping the opioids dose at minimal effective dose.   I went over the potential side effects of the NSAIDS on the gastrointestinal, renal and cardiovascular systems.      I detailed the side effects from the acetaminophen in the medication and made aware of those. I also explained about the cumulative effects on the organs and mainly the liver.     Given the opioids therapy , we discussed about the risk for accidental over dose on the pain medications, either for patient or other household. I went over the mechanism of action and mode of use of the Naloxone according to the  recommendations. I will provide a prescription for a kit.     Follow-up 8 weeks or earlier if needed     The level of clinical decision making in this office visit,  is high, given the high risks of complications with the morbidity and mortality due to the fact that acute and chronic pain may pose a threat to life and bodily function, if under treated, poorly treated, or with failure to maintain adequate treatment and timely medical follow up. Additionally over treatment has its own set of complications including overdosing on the pain medications and also the habit forming potentials with the  use of the medications used to treat chronic painful conditions including therapeutic classes classified as dangerous medications. Given the serious and fluctuating nature of pain level and instensity with extensive consideration for whenever pain changes, there is always the risk of prolonged functional impairment requiring close patient monitoring with regular assessments and reassessments and high level medical decision making at every office visit. The amount and complexity of data reviewed is high given the patient clinical presentation, labs,  data, radiology reports, and other tests as discussed during office visits. Pertinent data whether positive or negative were taken in consideration in the process of making this high level medical decision.

## 2024-10-15 ENCOUNTER — APPOINTMENT (OUTPATIENT)
Dept: PAIN MEDICINE | Facility: CLINIC | Age: 64
End: 2024-10-15
Payer: COMMERCIAL

## 2024-10-15 DIAGNOSIS — S32.10XA CLOSED FRACTURE OF SACRUM, UNSPECIFIED PORTION OF SACRUM, INITIAL ENCOUNTER (MULTI): ICD-10-CM

## 2024-10-15 DIAGNOSIS — S22.41XA CLOSED FRACTURE OF FOUR RIBS OF RIGHT SIDE, INITIAL ENCOUNTER: ICD-10-CM

## 2024-10-15 DIAGNOSIS — S46.011A RUPTURE OF RIGHT SUPRASPINATUS TENDON, INITIAL ENCOUNTER: Primary | ICD-10-CM

## 2024-10-15 DIAGNOSIS — M46.1 BILATERAL SACROILIITIS (CMS-HCC): ICD-10-CM

## 2024-10-15 DIAGNOSIS — S31.000A OPEN TRAUMATIC SUBLUXATION OF PELVIS, INITIAL ENCOUNTER: ICD-10-CM

## 2024-10-15 DIAGNOSIS — M43.27 ANKYLOSIS OF LUMBOSACRAL JOINT: ICD-10-CM

## 2024-10-15 DIAGNOSIS — S33.30XA OPEN TRAUMATIC SUBLUXATION OF PELVIS, INITIAL ENCOUNTER: ICD-10-CM

## 2024-10-15 PROCEDURE — 99214 OFFICE O/P EST MOD 30 MIN: CPT | Performed by: PHYSICAL MEDICINE & REHABILITATION

## 2024-10-15 RX ORDER — NABUMETONE 500 MG/1
500 TABLET, FILM COATED ORAL 2 TIMES DAILY
Qty: 60 TABLET | Refills: 11 | Status: SHIPPED | OUTPATIENT
Start: 2024-10-15 | End: 2025-10-15

## 2024-10-15 RX ORDER — HYDROCODONE BITARTRATE AND ACETAMINOPHEN 5; 325 MG/1; MG/1
1 TABLET ORAL EVERY 8 HOURS PRN
Qty: 84 TABLET | Refills: 0 | Status: SHIPPED | OUTPATIENT
Start: 2024-10-17 | End: 2024-11-14

## 2024-10-15 RX ORDER — LUBIPROSTONE 24 UG/1
24 CAPSULE ORAL 2 TIMES DAILY
Qty: 56 CAPSULE | Refills: 3 | Status: SHIPPED | OUTPATIENT
Start: 2024-10-15 | End: 2024-11-12

## 2024-10-15 RX ORDER — HYDROMORPHONE HYDROCHLORIDE 12 MG/1
1 TABLET, EXTENDED RELEASE ORAL DAILY
Qty: 28 TABLET | Refills: 0 | Status: SHIPPED | OUTPATIENT
Start: 2024-10-17 | End: 2024-11-14

## 2024-10-15 RX ORDER — TRAZODONE HYDROCHLORIDE 50 MG/1
50 TABLET ORAL NIGHTLY
Qty: 28 TABLET | Refills: 0 | Status: SHIPPED | OUTPATIENT
Start: 2024-10-15 | End: 2024-11-12

## 2024-10-15 NOTE — PROGRESS NOTES
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 Discussed about pain level changing and we do not know if the medications at this amount are still needed until we try and cut back slowly on pain medications. If the cut is tolerated then we continue. If cut not tolerated then, will go back on the pain medications level.  The goal from this is to keep the pain medications at the lowest effective dose.       Pain level without medication is 8/10 , with the medication pain level 2/10.     The pain meds are helping control the pain and improving Activities of Daily living and quality of life and quality of sleep.    opioids treatment agreement Jan 2024  Pill count today, using count tray, and in front of patient :  5 hydromorphone and 14 hydrocodone    pills , last fill was on 9/19  for 84 hydrocodone and 28 hydromorphone tabs,  the count is correct  Oarrs pulled and reviewed, no concerns  last urine toxicology testing earlier this year and it was compliant we will repeat  Xray updated spine   ORT Score is  0  Pain pathology and pain generators spine   Modalities tried injection, surgery, physical therapy, TENS unit, nonsteroidal anti-inflammatory medication       Review of Systems :  Denied any fever or chills. No weight  "loss and no night sweats. No cough or sputum production. No diarrhea   The constipation has not been responding to fibers and over the counter medications. He had Amitiza in past and that is working for her    No bladder and bowel incontinence and no other changes in bladder and bowel. No skin changes.  Reports tiredness and fatigability only if the pain is not controlled.     Denied opioids diversion and abuse and denies alcoholism. Denies overuse of the pain medications.  No reported euphoria sensation or getting a \"high\" on the pain medications.    The control of the pain with the pain medications is helping the control of the symptoms and allowing the function and activities of daily living, enjoyment of life, improving the quality of life and sleep with less interruption by the pain. The goal is symptomatic control of the nonmalignant chronic pain and not to repair the permanent damage in the tissues inducing the chronic pain conditions. We are aiming to shift the focus from the nonmalignant chronic pain to other aspects of life by symptomatically treating this chronic pain. If this pain is not treated it will lead to major morbidity and it is also associated with increased risks of mortality. The patient understands those very clearly and also understand high risks of morbidity and mortality if not strictly adherent to the treatment recommendations and reporting any associated side effects. Also patient understand the full responsibility associated with these medications to avoid abuse or overuse or any use of these medications for anything besides treating the patient's own chronic pain and nothing else under any circumstances.        Physical examination  Awake, alert and oriented for time place and persons   declined Chaperone for the visit and was adequately  draped for the exam.    Equivocal loading of facets and SIG  plantar cutaneous reflex are down going bilaterally  Pain inhibition of the hips Manual " Muscle strength testing because of the pain at the lower back of and over SIJ. the endurance is decreased even though the initial resistance was 5/5 but could not keep beyond initial resistance.      SLR increased back pain     Diagnosis  Problem List Items Addressed This Visit       Ankylosis of lumbosacral joint    Relevant Medications    HYDROmorphone 12 mg tablet extended release 24 hr (Start on 10/17/2024)    HYDROcodone-acetaminophen (Norco) 5-325 mg tablet (Start on 10/17/2024)    lubiprostone (Amitiza) 24 mcg capsule    nabumetone (Relafen) 500 mg tablet    traZODone (Desyrel) 50 mg tablet    Bilateral sacroiliitis (CMS-HCC)    Relevant Medications    HYDROmorphone 12 mg tablet extended release 24 hr (Start on 10/17/2024)    HYDROcodone-acetaminophen (Norco) 5-325 mg tablet (Start on 10/17/2024)    lubiprostone (Amitiza) 24 mcg capsule    nabumetone (Relafen) 500 mg tablet    traZODone (Desyrel) 50 mg tablet    Closed fracture of four ribs    Relevant Medications    HYDROmorphone 12 mg tablet extended release 24 hr (Start on 10/17/2024)    HYDROcodone-acetaminophen (Norco) 5-325 mg tablet (Start on 10/17/2024)    lubiprostone (Amitiza) 24 mcg capsule    nabumetone (Relafen) 500 mg tablet    traZODone (Desyrel) 50 mg tablet    Closed fracture of sacrum (Multi)    Relevant Medications    HYDROmorphone 12 mg tablet extended release 24 hr (Start on 10/17/2024)    HYDROcodone-acetaminophen (Norco) 5-325 mg tablet (Start on 10/17/2024)    lubiprostone (Amitiza) 24 mcg capsule    nabumetone (Relafen) 500 mg tablet    traZODone (Desyrel) 50 mg tablet    Open traumatic subluxation of pelvis    Relevant Medications    HYDROmorphone 12 mg tablet extended release 24 hr (Start on 10/17/2024)    HYDROcodone-acetaminophen (Norco) 5-325 mg tablet (Start on 10/17/2024)    lubiprostone (Amitiza) 24 mcg capsule    nabumetone (Relafen) 500 mg tablet    traZODone (Desyrel) 50 mg tablet    Rupture of supraspinatus tendon -  Primary    Relevant Medications    HYDROmorphone 12 mg tablet extended release 24 hr (Start on 10/17/2024)    HYDROcodone-acetaminophen (Norco) 5-325 mg tablet (Start on 10/17/2024)    lubiprostone (Amitiza) 24 mcg capsule    nabumetone (Relafen) 500 mg tablet    traZODone (Desyrel) 50 mg tablet        Plan  Reviewed the pain generators.  Went over the types of pain with neuropathic and nociceptive and different pathologies and therapeutic modalities. Discussed the mechanism of action of interventions from acupuncture, physical therapy , regular exercises, injections, botox, spinal cord stimulation, and role of surgery     Went over pathology of the intervertebral disc displacement and the anatomical relation to the Nerve roots and relation to the radicular symptoms. Went over treatment modalities with conservative treatment including acupuncture   and epidural steroid injection with fluoroscopy guidance and last resort of surgery    Based on the above findings and the clinical response to the opioids medications and improvement of the activities of daily living, sleep, and work performance. We made this complex decision to continue the opioids therapy in light of the evidence of the patient's responsibility in using the pain medications as prescribed for the nonmalignant chronic pain condition. We discussed about the use of the pain medications to treat the symptoms of chronic nonmalignant pain and we are not trying the repair the permanent damage in the tissues, rather we are trying to control the symptoms induced by the permanent damage to the tissues inducing the chronic pain condition and resulting disability. I explained the difference and discussed it with the patient and stressed the importance of knowing the difference especially because of the potential side effects and the potential addicting effect and habit forming nature of the dangerous drugs we are using to treat the symptoms of the chronic pain.       We discussed that we are prescribing the medications on good leo and legitimate medical reason.     We reviewed the side effects and precautions of opioids prescriptions as discussed in the opioids treatment agreement.    realizes the interaction between the therapeutic classes including the respiratory depression and potential death     Random drug testing   we will submit     Consider repeat injeciton if aggravation  of the pain   realizes the interaction between the therapeutic classes including the respiratory depression and potential death   has a narcan at home know how and when to use it if needed.     Discussed about NSAIDS and I explained about the opioids sparing effect to allow keeping the opioids dose at minimal effective dose.   I went over the potential side effects of the NSAIDS on the gastrointestinal, renal and cardiovascular systems.      I detailed the side effects from the acetaminophen in the medication and made aware of those. I also explained about the cumulative effects on the organs and mainly the liver.     Given the opioids therapy , we discussed about the risk for accidental over dose on the pain medications, either for patient or other household. I went over the mechanism of action and mode of use of the Naloxone according to the  recommendations. I will provide a prescription for a kit.     Follow-up 4  weeks or earlier if needed     The level of clinical decision making in this office visit,  is high, given the high risks of complications with the morbidity and mortality due to the fact that acute and chronic pain may pose a threat to life and bodily function, if under treated, poorly treated, or with failure to maintain adequate treatment and timely medical follow up. Additionally over treatment has its own set of complications including overdosing on the pain medications and also the habit forming potentials with the use of the medications used to treat chronic  painful conditions including therapeutic classes classified as dangerous medications. Given the serious and fluctuating nature of pain level and instensity with extensive consideration for whenever pain changes, there is always the risk of prolonged functional impairment requiring close patient monitoring with regular assessments and reassessments and high level medical decision making at every office visit. The amount and complexity of data reviewed is high given the patient clinical presentation, labs,  data, radiology reports, and other tests as discussed during office visits. Pertinent data whether positive or negative were taken in consideration in the process of making this high level medical decision.

## 2024-11-14 ENCOUNTER — APPOINTMENT (OUTPATIENT)
Dept: PAIN MEDICINE | Facility: CLINIC | Age: 64
End: 2024-11-14
Payer: COMMERCIAL

## 2024-12-09 ENCOUNTER — OFFICE VISIT (OUTPATIENT)
Dept: PAIN MEDICINE | Facility: CLINIC | Age: 64
End: 2024-12-09
Payer: COMMERCIAL

## 2024-12-09 VITALS
OXYGEN SATURATION: 95 % | HEART RATE: 88 BPM | HEIGHT: 73 IN | WEIGHT: 254 LBS | BODY MASS INDEX: 33.66 KG/M2 | RESPIRATION RATE: 18 BRPM | TEMPERATURE: 97.3 F | DIASTOLIC BLOOD PRESSURE: 77 MMHG | SYSTOLIC BLOOD PRESSURE: 142 MMHG

## 2024-12-09 DIAGNOSIS — S32.10XA CLOSED FRACTURE OF SACRUM, UNSPECIFIED PORTION OF SACRUM, INITIAL ENCOUNTER (MULTI): ICD-10-CM

## 2024-12-09 DIAGNOSIS — M43.27 ANKYLOSIS OF LUMBOSACRAL JOINT: ICD-10-CM

## 2024-12-09 DIAGNOSIS — T40.2X5A OPIOID-INDUCED CONSTIPATION: Primary | ICD-10-CM

## 2024-12-09 DIAGNOSIS — M46.1 BILATERAL SACROILIITIS (CMS-HCC): ICD-10-CM

## 2024-12-09 DIAGNOSIS — S46.011A RUPTURE OF RIGHT SUPRASPINATUS TENDON, INITIAL ENCOUNTER: ICD-10-CM

## 2024-12-09 DIAGNOSIS — S33.30XA OPEN TRAUMATIC SUBLUXATION OF PELVIS, INITIAL ENCOUNTER: ICD-10-CM

## 2024-12-09 DIAGNOSIS — S31.000A OPEN TRAUMATIC SUBLUXATION OF PELVIS, INITIAL ENCOUNTER: ICD-10-CM

## 2024-12-09 DIAGNOSIS — S22.41XA CLOSED FRACTURE OF FOUR RIBS OF RIGHT SIDE, INITIAL ENCOUNTER: ICD-10-CM

## 2024-12-09 DIAGNOSIS — K59.03 OPIOID-INDUCED CONSTIPATION: Primary | ICD-10-CM

## 2024-12-09 PROCEDURE — 99213 OFFICE O/P EST LOW 20 MIN: CPT | Performed by: ANESTHESIOLOGY

## 2024-12-09 RX ORDER — HYDROCODONE BITARTRATE AND ACETAMINOPHEN 5; 325 MG/1; MG/1
1 TABLET ORAL EVERY 8 HOURS PRN
Qty: 84 TABLET | Refills: 0 | Status: SHIPPED | OUTPATIENT
Start: 2024-12-09

## 2024-12-09 RX ORDER — TRAZODONE HYDROCHLORIDE 50 MG/1
50 TABLET ORAL NIGHTLY
Qty: 28 TABLET | Refills: 0 | Status: SHIPPED | OUTPATIENT
Start: 2024-12-09 | End: 2025-01-06

## 2024-12-09 RX ORDER — HYDROCODONE BITARTRATE AND ACETAMINOPHEN 5; 325 MG/1; MG/1
1 TABLET ORAL 2 TIMES DAILY
COMMUNITY
End: 2024-12-09 | Stop reason: SDUPTHER

## 2024-12-09 SDOH — ECONOMIC STABILITY: FOOD INSECURITY: WITHIN THE PAST 12 MONTHS, THE FOOD YOU BOUGHT JUST DIDN'T LAST AND YOU DIDN'T HAVE MONEY TO GET MORE.: NEVER TRUE

## 2024-12-09 SDOH — ECONOMIC STABILITY: FOOD INSECURITY: WITHIN THE PAST 12 MONTHS, YOU WORRIED THAT YOUR FOOD WOULD RUN OUT BEFORE YOU GOT MONEY TO BUY MORE.: NEVER TRUE

## 2024-12-09 ASSESSMENT — PATIENT HEALTH QUESTIONNAIRE - PHQ9
1. LITTLE INTEREST OR PLEASURE IN DOING THINGS: NOT AT ALL
2. FEELING DOWN, DEPRESSED OR HOPELESS: NOT AT ALL
SUM OF ALL RESPONSES TO PHQ9 QUESTIONS 1 AND 2: 0

## 2024-12-09 ASSESSMENT — ENCOUNTER SYMPTOMS
OCCASIONAL FEELINGS OF UNSTEADINESS: 1
BLOOD IN STOOL: 0
SHORTNESS OF BREATH: 0
FEVER: 0
LOSS OF SENSATION IN FEET: 0
DEPRESSION: 0
CONSTIPATION: 1
BACK PAIN: 1

## 2024-12-09 ASSESSMENT — COLUMBIA-SUICIDE SEVERITY RATING SCALE - C-SSRS
1. IN THE PAST MONTH, HAVE YOU WISHED YOU WERE DEAD OR WISHED YOU COULD GO TO SLEEP AND NOT WAKE UP?: NO
2. HAVE YOU ACTUALLY HAD ANY THOUGHTS OF KILLING YOURSELF?: NO
6. HAVE YOU EVER DONE ANYTHING, STARTED TO DO ANYTHING, OR PREPARED TO DO ANYTHING TO END YOUR LIFE?: NO

## 2024-12-09 ASSESSMENT — PAIN SCALES - GENERAL
PAINLEVEL_OUTOF10: 7
PAINLEVEL_OUTOF10: 7

## 2024-12-09 ASSESSMENT — PAIN - FUNCTIONAL ASSESSMENT: PAIN_FUNCTIONAL_ASSESSMENT: 0-10

## 2024-12-09 ASSESSMENT — LIFESTYLE VARIABLES
AUDIT-C TOTAL SCORE: 0
HOW OFTEN DO YOU HAVE SIX OR MORE DRINKS ON ONE OCCASION: NEVER
HOW OFTEN DO YOU HAVE A DRINK CONTAINING ALCOHOL: NEVER
SKIP TO QUESTIONS 9-10: 1
HOW MANY STANDARD DRINKS CONTAINING ALCOHOL DO YOU HAVE ON A TYPICAL DAY: PATIENT DOES NOT DRINK

## 2024-12-09 ASSESSMENT — PAIN DESCRIPTION - DESCRIPTORS: DESCRIPTORS: SHARP;ACHING;DULL

## 2024-12-09 NOTE — PROGRESS NOTES
Chief Complain  Follow-up ([facility] for DR. Roberts  pain 7/10 low back into bilateral legs, right side worse, pelvic. Patient needs refill on medications.)       History Of Present Illness  Jared Thomson is a 64 y.o. male here for low back and pelvis radiating to right lower extremity. The patient rates the pain at 7  on a scale from 0-10.  The patient describes pain as sharp.  The pain is worsened by standing, prolonged sitting, and walking and is alleviated by medications prescribed pain medications.  Since the last visit the pain has worsened.  The patient denies any fever, chills, weight loss, bladder/bowel incontinence.     Past Medical History  He has a past medical history of Cervicalgia (10/07/2015), Personal history of other diseases of the musculoskeletal system and connective tissue, Personal history of other diseases of the musculoskeletal system and connective tissue, and Type 2 diabetes mellitus.    Surgical History  He has no past surgical history on file.     Social History  He reports that he has never smoked. He has never used smokeless tobacco. He reports current alcohol use of about 2.0 standard drinks of alcohol per week. He reports that he does not use drugs.    Family History  No family history on file.     Allergies  Penicillins, Adhesive tape-silicones, and Latex    Review of Systems  Review of Systems   Constitutional:  Negative for fever.   Respiratory:  Negative for shortness of breath.    Cardiovascular:  Negative for chest pain.   Gastrointestinal:  Positive for constipation. Negative for blood in stool.   Musculoskeletal:  Positive for back pain.   Psychiatric/Behavioral:  Negative for suicidal ideas.         Physical Exam  Physical Exam  Constitutional:       Appearance: Normal appearance.   HENT:      Head: Normocephalic and atraumatic.   Eyes:      Extraocular Movements: Extraocular movements intact.      Pupils: Pupils are equal, round, and reactive to light.   Neurological:      " Mental Status: He is alert and oriented to person, place, and time.   Psychiatric:         Behavior: Behavior normal.           Last Recorded Vitals  Blood pressure 142/77, pulse 88, temperature 36.3 °C (97.3 °F), temperature source Tympanic, resp. rate 18, height 1.854 m (6' 1\"), weight 115 kg (254 lb), SpO2 95%.       Assessment/Plan     Jared Thomson is a 64 y.o. male here for follow-up of back, pelvic pain.  He has been experiencing these symptoms after a fall he had from a tree several years ago leading to traumatic fracture of his pelvis.  He is a  patient we are covering for him in his absence.  Currently managing his pain with hydromorphone once a day, hydrocodone 5 mg every 8 hours as needed.  The combination provided him with modest relief of pain.  He has been off these medications for 3 weeks or so.  He had noticed some withdrawal early on which has improved now.  He was getting more benefit from the hydrocodone compared to the hydromorphone.  I agreed to put him back on hydrocodone, however I would leave the decision of hydromorphone to .  Refill for hydrocodone was provided to the patient.  Will trial him on Movantik for his opioid-induced constipation.  Continue rest of medical management with .  I have personally reviewed the OARRS report.  I have considered the risks of abuse, dependence, addiction and diversion.        Total time spent caring for the patient today was 24 minutes. This includes time spent before the visit reviewing the chart, time spent during the visit, and time spent after the visit on documentation.      Brody Mi MD  "

## 2024-12-12 ENCOUNTER — TELEPHONE (OUTPATIENT)
Dept: PAIN MEDICINE | Facility: CLINIC | Age: 64
End: 2024-12-12
Payer: COMMERCIAL

## 2024-12-20 ENCOUNTER — TELEPHONE (OUTPATIENT)
Dept: PAIN MEDICINE | Facility: CLINIC | Age: 64
End: 2024-12-20
Payer: COMMERCIAL

## 2024-12-20 NOTE — TELEPHONE ENCOUNTER
Is a [facility] patient and is getting medication denials due to [facility] insurance.

## 2025-01-02 ENCOUNTER — APPOINTMENT (OUTPATIENT)
Dept: PAIN MEDICINE | Facility: CLINIC | Age: 65
End: 2025-01-02
Payer: COMMERCIAL

## 2025-01-02 DIAGNOSIS — M43.27 ANKYLOSIS OF LUMBOSACRAL JOINT: ICD-10-CM

## 2025-01-02 DIAGNOSIS — S32.10XA CLOSED FRACTURE OF SACRUM, UNSPECIFIED PORTION OF SACRUM, INITIAL ENCOUNTER (MULTI): ICD-10-CM

## 2025-01-02 DIAGNOSIS — S22.41XA CLOSED FRACTURE OF FOUR RIBS OF RIGHT SIDE, INITIAL ENCOUNTER: ICD-10-CM

## 2025-01-02 DIAGNOSIS — T40.2X5A OPIOID-INDUCED CONSTIPATION: ICD-10-CM

## 2025-01-02 DIAGNOSIS — S31.000A OPEN TRAUMATIC SUBLUXATION OF PELVIS, INITIAL ENCOUNTER: ICD-10-CM

## 2025-01-02 DIAGNOSIS — M46.1 BILATERAL SACROILIITIS (CMS-HCC): ICD-10-CM

## 2025-01-02 DIAGNOSIS — K59.03 OPIOID-INDUCED CONSTIPATION: ICD-10-CM

## 2025-01-02 DIAGNOSIS — S33.30XA OPEN TRAUMATIC SUBLUXATION OF PELVIS, INITIAL ENCOUNTER: ICD-10-CM

## 2025-01-02 DIAGNOSIS — S46.011A RUPTURE OF RIGHT SUPRASPINATUS TENDON, INITIAL ENCOUNTER: ICD-10-CM

## 2025-01-02 PROCEDURE — 99214 OFFICE O/P EST MOD 30 MIN: CPT | Performed by: PHYSICAL MEDICINE & REHABILITATION

## 2025-01-02 RX ORDER — TRAZODONE HYDROCHLORIDE 50 MG/1
50 TABLET ORAL NIGHTLY
Qty: 28 TABLET | Refills: 1 | Status: SHIPPED | OUTPATIENT
Start: 2025-01-02 | End: 2025-01-30

## 2025-01-02 RX ORDER — HYDROCODONE BITARTRATE AND ACETAMINOPHEN 5; 325 MG/1; MG/1
1 TABLET ORAL EVERY 8 HOURS PRN
Qty: 84 TABLET | Refills: 0 | Status: SHIPPED | OUTPATIENT
Start: 2025-01-06

## 2025-01-02 RX ORDER — NABUMETONE 500 MG/1
500 TABLET, FILM COATED ORAL 2 TIMES DAILY
Qty: 60 TABLET | Refills: 1 | Status: SHIPPED | OUTPATIENT
Start: 2025-01-02 | End: 2026-01-02

## 2025-01-02 NOTE — PROGRESS NOTES
[facility] [id number]  DOI 1.18.2007      · Rupture of supraspinatus tendon (840.4) (S46.019A)   · Open traumatic subluxation of pelvis, initial encounter (839.79) (S33.30XA,S31.000A)   · Bilateral sacroiliitis (720.2) (M46.1)   · Ankylosis of lumbosacral joint (724.6) (M43.27)    Chief complaint  Back pain getting worse again     History  Jared Thomson is back for pain management office visit  Jared Thomson was at home.  Could not physically come to the office because of upper respiratory tract symptoms .  I was at the office.  I used secure audiovisual communication provided by the Epic system. Jared Thomson  agreed on this form of communication and consented to the visit via A/V method.  The patient also understood that this will be billed as office visit.     Stopped LA hydromorphone and htat is good but pain increasing and wants to consider acupuncture again and SIJ injeciton . Thos were helping and wants to try with injection first because this is a deeper pain.  He also have the pain from the muscles around the sacroiliac joint that is responding to the acupuncture.  We discussed that and I certainly agree with him if he can get clearance of the pain from the sacroiliac joint hopefully we can get it from the surrounding muscle if at that point we will use the acupuncture he want to start with the SI joint and we will request [facility] to approve us for that we will submit the C9 for that to inject sacroiliac joint bilaterally on 2 separate occasions.  In the past that gave him good relief which lasted for several months ago over 90%    He was able to cut back on the hydromorphone and that is a good thing as I discussed above but certainly we will need to supplement his pain control with the injection.  We will add acupuncture if needed.    Again today discussed with him about always trying to find the lowest effective dose on the pain medication that we will have multiple therapeutic effect including  "finding the lower effective dose and also cutting back on the development of tolerance.  I also discussed with him that with his age the metabolism of the medication has time to slowing down and the medication may be accumulating.  We do not want to wait until we have issues with the medication to start cutting back and we will act preemptively      Pain level without medication is 8/10 , with the medication pain level between 4 and 5/10.  And that is because of the recent aggravation of the pain    The pain meds are helping control the pain and improving Activities of Daily living and quality of life and quality of sleep.    opioids treatment agreement January 2025  Pill count he is due to fill today and compatible with the last refill date according to PDMP  Oarrs pulled and reviewed, no concerns  last urine toxicology testing earlier this year and it was compliant we will repeat  Xray updated spine and shoulder  ORT Score is 0  Pain pathology and pain generators spine and shoulder and sacroiliac joints  Modalities tried injection, surgery, physical therapy, TENS unit, nonsteroidal anti-inflammatory medication multiple interventions and acupuncture as discussed above    Review of Systems :  Denied any fever or chills. No weight loss and no night sweats. No cough or sputum production. No diarrhea   The constipation has been responding to fibers and over the counter medications.     No bladder and bowel incontinence and no other changes in bladder and bowel. No skin changes.  Reports tiredness and fatigability only if the pain is not controlled.     Denied opioids diversion and abuse and denies alcoholism. Denies overuse of the pain medications.  No reported euphoria sensation or getting a \"high\" on the pain medications.    The control of the pain with the pain medications is helping the control of the symptoms and allowing the function and activities of daily living, enjoyment of life, improving the quality of life " and sleep with less interruption by the pain. The goal is symptomatic control of the nonmalignant chronic pain and not to repair the permanent damage in the tissues inducing the chronic pain conditions. We are aiming to shift the focus from the nonmalignant chronic pain to other aspects of life by symptomatically treating this chronic pain. If this pain is not treated it will lead to major morbidity and it is also associated with increased risks of mortality. The patient understands those very clearly and also understand high risks of morbidity and mortality if not strictly adherent to the treatment recommendations and reporting any associated side effects. Also patient understand the full responsibility associated with these medications to avoid abuse or overuse or any use of these medications for anything besides treating the patient's own chronic pain and nothing else under any circumstances.        Physical examination  Awake, alert and oriented for time place and persons   This is a secure A/V visit    Diagnosis  Problem List Items Addressed This Visit       Ankylosis of lumbosacral joint    Relevant Medications    HYDROcodone-acetaminophen (Norco) 5-325 mg tablet (Start on 1/6/2025)    traZODone (Desyrel) 50 mg tablet    nabumetone (Relafen) 500 mg tablet    Bilateral sacroiliitis (CMS-HCC)    Relevant Medications    HYDROcodone-acetaminophen (Norco) 5-325 mg tablet (Start on 1/6/2025)    traZODone (Desyrel) 50 mg tablet    nabumetone (Relafen) 500 mg tablet    Other Relevant Orders    FL pain management    Sacroiliac Joint Injection    Closed fracture of four ribs    Relevant Medications    HYDROcodone-acetaminophen (Norco) 5-325 mg tablet (Start on 1/6/2025)    traZODone (Desyrel) 50 mg tablet    nabumetone (Relafen) 500 mg tablet    Closed fracture of sacrum (Multi)    Relevant Medications    HYDROcodone-acetaminophen (Norco) 5-325 mg tablet (Start on 1/6/2025)    traZODone (Desyrel) 50 mg tablet     nabumetone (Relafen) 500 mg tablet    Open traumatic subluxation of pelvis    Relevant Medications    HYDROcodone-acetaminophen (Norco) 5-325 mg tablet (Start on 1/6/2025)    traZODone (Desyrel) 50 mg tablet    nabumetone (Relafen) 500 mg tablet    Rupture of supraspinatus tendon    Relevant Medications    HYDROcodone-acetaminophen (Norco) 5-325 mg tablet (Start on 1/6/2025)    traZODone (Desyrel) 50 mg tablet    nabumetone (Relafen) 500 mg tablet     Other Visit Diagnoses       Opioid-induced constipation        Relevant Medications    naloxegol oxalate (Movantik) 25 mg             Plan  Reviewed the pain generators.  Went over the types of pain with neuropathic and nociceptive and different pathologies and therapeutic modalities. Discussed the mechanism of action of interventions from acupuncture, physical therapy , regular exercises, injections, botox, spinal cord stimulation, and role of surgery     Went over pathology of the intervertebral disc displacement and the anatomical relation to the Nerve roots and relation to the radicular symptoms. Went over treatment modalities with conservative treatment including acupuncture   and epidural steroid injection with fluoroscopy guidance and last resort of surgery    Based on the above findings and the clinical response to the opioids medications and improvement of the activities of daily living, sleep, and work performance. We made this complex decision to continue the opioids therapy in light of the evidence of the patient's responsibility in using the pain medications as prescribed for the nonmalignant chronic pain condition. We discussed about the use of the pain medications to treat the symptoms of chronic nonmalignant pain and we are not trying the repair the permanent damage in the tissues, rather we are trying to control the symptoms induced by the permanent damage to the tissues inducing the chronic pain condition and resulting disability. I explained the  difference and discussed it with the patient and stressed the importance of knowing the difference especially because of the potential side effects and the potential addicting effect and habit forming nature of the dangerous drugs we are using to treat the symptoms of the chronic pain.      We discussed that we are prescribing the medications on good leo and legitimate medical reason.     We reviewed the side effects and precautions of opioids prescriptions as discussed in the opioids treatment agreement.    realizes the interaction between the therapeutic classes including the respiratory depression and potential death     Random drug testing   we will submit     At this time we will C 9 sacroiliac joint injection bilaterally  Risks not limited to infection, sepsis, abscess, bleeding , nerve injury, paralysis, and death...   Continue with hydrocodone stay off long-acting formulation with hydromorphone and we will renew adjuvant therapy.  His constipation is getting better since he stopped hydromorphone but he still needs some help we will send 1 more prescription for Amitiza and hopefully he will not need it after 6 months    Discussed about NSAIDS and I explained about the opioids sparing effect to allow keeping the opioids dose at minimal effective dose.   I went over the potential side effects of the NSAIDS on the gastrointestinal, renal and cardiovascular systems.      I detailed the side effects from the acetaminophen in the medication and made aware of those. I also explained about the cumulative effects on the organs and mainly the liver.     Given the opioids therapy , we discussed about the risk for accidental over dose on the pain medications, either for patient or other household. I went over the mechanism of action and mode of use of the Naloxone according to the  recommendations. I will provide a prescription for a kit.     Follow-up after the above or earlier if needed     The level of  clinical decision making in this office visit,  is high, given the high risks of complications with the morbidity and mortality due to the fact that acute and chronic pain may pose a threat to life and bodily function, if under treated, poorly treated, or with failure to maintain adequate treatment and timely medical follow up. Additionally over treatment has its own set of complications including overdosing on the pain medications and also the habit forming potentials with the use of the medications used to treat chronic painful conditions including therapeutic classes classified as dangerous medications. Given the serious and fluctuating nature of pain level and instensity with extensive consideration for whenever pain changes, there is always the risk of prolonged functional impairment requiring close patient monitoring with regular assessments and reassessments and high level medical decision making at every office visit. The amount and complexity of data reviewed is high given the patient clinical presentation, labs,  data, radiology reports, and other tests as discussed during office visits. Pertinent data whether positive or negative were taken in consideration in the process of making this high level medical decision.

## 2025-01-15 DIAGNOSIS — M46.1 BILATERAL SACROILIITIS (CMS-HCC): Primary | ICD-10-CM

## 2025-02-03 ENCOUNTER — APPOINTMENT (OUTPATIENT)
Dept: PAIN MEDICINE | Facility: CLINIC | Age: 65
End: 2025-02-03
Payer: COMMERCIAL

## 2025-02-03 DIAGNOSIS — S22.41XA CLOSED FRACTURE OF FOUR RIBS OF RIGHT SIDE, INITIAL ENCOUNTER: ICD-10-CM

## 2025-02-03 DIAGNOSIS — S32.10XA CLOSED FRACTURE OF SACRUM, UNSPECIFIED PORTION OF SACRUM, INITIAL ENCOUNTER (MULTI): ICD-10-CM

## 2025-02-03 DIAGNOSIS — K59.03 OPIOID-INDUCED CONSTIPATION: ICD-10-CM

## 2025-02-03 DIAGNOSIS — M43.27 ANKYLOSIS OF LUMBOSACRAL JOINT: ICD-10-CM

## 2025-02-03 DIAGNOSIS — M46.1 BILATERAL SACROILIITIS (CMS-HCC): ICD-10-CM

## 2025-02-03 DIAGNOSIS — S46.011A RUPTURE OF RIGHT SUPRASPINATUS TENDON, INITIAL ENCOUNTER: ICD-10-CM

## 2025-02-03 DIAGNOSIS — S33.30XA OPEN TRAUMATIC SUBLUXATION OF PELVIS, INITIAL ENCOUNTER: ICD-10-CM

## 2025-02-03 DIAGNOSIS — T40.2X5A OPIOID-INDUCED CONSTIPATION: ICD-10-CM

## 2025-02-03 DIAGNOSIS — S31.000A OPEN TRAUMATIC SUBLUXATION OF PELVIS, INITIAL ENCOUNTER: ICD-10-CM

## 2025-02-03 RX ORDER — HYDROCODONE BITARTRATE AND ACETAMINOPHEN 5; 325 MG/1; MG/1
1 TABLET ORAL EVERY 8 HOURS PRN
Qty: 84 TABLET | Refills: 0 | Status: SHIPPED | OUTPATIENT
Start: 2025-03-05 | End: 2025-04-02

## 2025-02-03 RX ORDER — TRAZODONE HYDROCHLORIDE 50 MG/1
100 TABLET ORAL NIGHTLY
Qty: 28 TABLET | Refills: 1 | Status: SHIPPED | OUTPATIENT
Start: 2025-02-03 | End: 2025-03-03

## 2025-02-03 RX ORDER — HYDROCODONE BITARTRATE AND ACETAMINOPHEN 5; 325 MG/1; MG/1
1 TABLET ORAL EVERY 8 HOURS PRN
Qty: 84 TABLET | Refills: 0 | Status: SHIPPED | OUTPATIENT
Start: 2025-02-05 | End: 2025-03-05

## 2025-02-03 NOTE — PROGRESS NOTES
Pain in the back and pain in the shoulder [facility] [id number]  DOI 1.18.2007      · Rupture of supraspinatus tendon (840.4) (S46.019A)   · Open traumatic subluxation of pelvis, initial encounter (839.79) (S33.30XA,S31.000A)   · Bilateral sacroiliitis (720.2) (M46.1)   · Ankylosis of lumbosacral joint (724.6) (M43.27)      Chief complaint  Lower back pain and right shoulder pain    My nurse  Cheyenne DAVIS LPN,   was present during the entire history and physical examination    History  Jared Thomson is back for pain management office visit  He continues to have the pain in the lower back he is approved for the injection and report him on the schedule however he needed FMLA forms filled for his employer to allow him to come for the injection.  Will fill those for him.  The pain in the lower back is deep achy stabbing.  He is having aggravation and usually respond to the aggravation.  In the past, he had the intra-articular injection for the SI joint and does control the pain.  In addition he had acupuncture and those helped him Back on the pain medication in the past he was on a long-acting formulation and short acting formulation he is currently off the long-acting hydromorphone and will cut back to short acting from 4 a day to 3 a day hydrocodone 5 mg    The pain is deep aching in the small of the back worse with bending and extension no radicular symptoms to the lower limbs no bowel or bladder incontinence he is walking with slightly wider   perimeter of stance but he does not use a cane    Again today, long discussion about putting effort in cutting back on pain medications. Discussed about pain level changing and we do not know if the medications at this amount are still needed until we try and cut back slowly on pain medications. If the cut is tolerated then we continue. If cut not tolerated then, will go back on the pain medications level.  The goal from this is to keep the pain medications at the lowest  "effective dose.    Pain level without medication is 8/10 , with the medication pain level between 4 and 5/10.  And that is because of the recent aggravation that is injection    Pain disability index improvement by 2 to 3 points, across functional categories, with the pain control with the meds. Forms filled by patient are scanned in the chart    The pain meds are helping control the pain and improving Activities of Daily living and quality of life and quality of sleep.    opioids treatment agreement today      Pill count today, using count tray, and in front of patient :  8    pills , last fill was on 1/10  for 84 tabs,  the count is correct  Oarrs pulled and reviewed, no concerns  last urine toxicology testing was compliant this was done on : September 2024  Xray updated spine  ORT Score is 0  Pain pathology and pain generators spine and sacroiliac joint  Modalities tried injection, surgery, physical therapy, TENS unit, nonsteroidal anti-inflammatory medication acupuncture and multiple injections    Review of Systems :  Denied any fever or chills. No weight loss and no night sweats. No cough or sputum production. No diarrhea   The constipation has been responding to fibers and over the counter medications.     No bladder and bowel incontinence and no other changes in bladder and bowel. No skin changes.  Reports tiredness and fatigability only if the pain is not controlled.     Denied opioids diversion and abuse and denies alcoholism. Denies overuse of the pain medications.  No reported euphoria sensation or getting a \"high\" on the pain medications.    The control of the pain with the pain medications is helping the control of the symptoms and allowing the function and activities of daily living, enjoyment of life, improving the quality of life and sleep with less interruption by the pain. The goal is symptomatic control of the nonmalignant chronic pain and not to repair the permanent damage in the tissues inducing " the chronic pain conditions. We are aiming to shift the focus from the nonmalignant chronic pain to other aspects of life by symptomatically treating this chronic pain. If this pain is not treated it will lead to major morbidity and it is also associated with increased risks of mortality. The patient understands those very clearly and also understand high risks of morbidity and mortality if not strictly adherent to the treatment recommendations and reporting any associated side effects. Also patient understand the full responsibility associated with these medications to avoid abuse or overuse or any use of these medications for anything besides treating the patient's own chronic pain and nothing else under any circumstances.        Physical examination  Awake, alert and oriented for time place and persons     Positive provocative maneuvers of the sacroiliac joint on both sides with positive compression test figure-of-four or ELIDIA  Also again lower limb tested also positive, Yergason Is a Positive  No radicular symptoms to the lower limb.  Abdirahman testing are negative.  No aberrant pain behavior    Diagnosis  Problem List Items Addressed This Visit       Ankylosis of lumbosacral joint    Relevant Medications    HYDROcodone-acetaminophen (Norco) 5-325 mg tablet (Start on 2/5/2025)    HYDROcodone-acetaminophen (Norco) 5-325 mg tablet (Start on 3/5/2025)    traZODone (Desyrel) 50 mg tablet    Bilateral sacroiliitis (CMS-HCC)    Relevant Medications    HYDROcodone-acetaminophen (Norco) 5-325 mg tablet (Start on 2/5/2025)    HYDROcodone-acetaminophen (Norco) 5-325 mg tablet (Start on 3/5/2025)    traZODone (Desyrel) 50 mg tablet    Closed fracture of four ribs    Relevant Medications    HYDROcodone-acetaminophen (Norco) 5-325 mg tablet (Start on 2/5/2025)    HYDROcodone-acetaminophen (Norco) 5-325 mg tablet (Start on 3/5/2025)    traZODone (Desyrel) 50 mg tablet    Closed fracture of sacrum (Multi)    Relevant  Medications    HYDROcodone-acetaminophen (Norco) 5-325 mg tablet (Start on 2/5/2025)    HYDROcodone-acetaminophen (Norco) 5-325 mg tablet (Start on 3/5/2025)    traZODone (Desyrel) 50 mg tablet    Open traumatic subluxation of pelvis    Relevant Medications    HYDROcodone-acetaminophen (Norco) 5-325 mg tablet (Start on 2/5/2025)    HYDROcodone-acetaminophen (Norco) 5-325 mg tablet (Start on 3/5/2025)    traZODone (Desyrel) 50 mg tablet    Rupture of supraspinatus tendon    Relevant Medications    HYDROcodone-acetaminophen (Norco) 5-325 mg tablet (Start on 2/5/2025)    HYDROcodone-acetaminophen (Norco) 5-325 mg tablet (Start on 3/5/2025)    traZODone (Desyrel) 50 mg tablet     Other Visit Diagnoses       Opioid-induced constipation                 Plan  Reviewed the pain generators.  Went over the types of pain with neuropathic and nociceptive and different pathologies and therapeutic modalities. Discussed the mechanism of action of interventions from acupuncture, physical therapy , regular exercises, injections, botox, spinal cord stimulation, and role of surgery     Went over pathology of the intervertebral disc displacement and the anatomical relation to the Nerve roots and relation to the radicular symptoms. Went over treatment modalities with conservative treatment including acupuncture   and epidural steroid injection with fluoroscopy guidance and last resort of surgery    Based on the above findings and the clinical response to the opioids medications and improvement of the activities of daily living, sleep, and work performance. We made this complex decision to continue the opioids therapy in light of the evidence of the patient's responsibility in using the pain medications as prescribed for the nonmalignant chronic pain condition. We discussed about the use of the pain medications to treat the symptoms of chronic nonmalignant pain and we are not trying the repair the permanent damage in the tissues, rather  we are trying to control the symptoms induced by the permanent damage to the tissues inducing the chronic pain condition and resulting disability. I explained the difference and discussed it with the patient and stressed the importance of knowing the difference especially because of the potential side effects and the potential addicting effect and habit forming nature of the dangerous drugs we are using to treat the symptoms of the chronic pain.      We discussed that we are prescribing the medications on good leo and legitimate medical reason.     We reviewed the side effects and precautions of opioids prescriptions as discussed in the opioids treatment agreement.    realizes the interaction between the therapeutic classes including the respiratory depression and potential death     Random drug testing   we will submit     Continue hydrocodone 3 times a day.  At this time, I filled FMLA form and we will keep him on the schedule for the injection    Discussed about NSAIDS and I explained about the opioids sparing effect to allow keeping the opioids dose at minimal effective dose.   I went over the potential side effects of the NSAIDS on the gastrointestinal, renal and cardiovascular systems.      I detailed the side effects from the acetaminophen in the medication and made aware of those. I also explained about the cumulative effects on the organs and mainly the liver.     Given the opioids therapy , we discussed about the risk for accidental over dose on the pain medications, either for patient or other household. I went over the mechanism of action and mode of use of the Naloxone according to the  recommendations. I will provide a prescription for a kit.     Follow-up 8 weeks or earlier if needed     The level of clinical decision making in this office visit,  is high, given the high risks of complications with the morbidity and mortality due to the fact that acute and chronic pain may pose a threat  to life and bodily function, if under treated, poorly treated, or with failure to maintain adequate treatment and timely medical follow up. Additionally over treatment has its own set of complications including overdosing on the pain medications and also the habit forming potentials with the use of the medications used to treat chronic painful conditions including therapeutic classes classified as dangerous medications. Given the serious and fluctuating nature of pain level and instensity with extensive consideration for whenever pain changes, there is always the risk of prolonged functional impairment requiring close patient monitoring with regular assessments and reassessments and high level medical decision making at every office visit. The amount and complexity of data reviewed is high given the patient clinical presentation, labs,  data, radiology reports, and other tests as discussed during office visits. Pertinent data whether positive or negative were taken in consideration in the process of making this high level medical decision.

## 2025-02-11 DIAGNOSIS — S33.30XA OPEN TRAUMATIC SUBLUXATION OF PELVIS, INITIAL ENCOUNTER: ICD-10-CM

## 2025-02-11 DIAGNOSIS — S46.011A RUPTURE OF RIGHT SUPRASPINATUS TENDON, INITIAL ENCOUNTER: ICD-10-CM

## 2025-02-11 DIAGNOSIS — S22.41XA CLOSED FRACTURE OF FOUR RIBS OF RIGHT SIDE, INITIAL ENCOUNTER: ICD-10-CM

## 2025-02-11 DIAGNOSIS — M43.27 ANKYLOSIS OF LUMBOSACRAL JOINT: ICD-10-CM

## 2025-02-11 DIAGNOSIS — S31.000A OPEN TRAUMATIC SUBLUXATION OF PELVIS, INITIAL ENCOUNTER: ICD-10-CM

## 2025-02-11 DIAGNOSIS — S32.10XA CLOSED FRACTURE OF SACRUM, UNSPECIFIED PORTION OF SACRUM, INITIAL ENCOUNTER (MULTI): ICD-10-CM

## 2025-02-11 DIAGNOSIS — M46.1 BILATERAL SACROILIITIS (CMS-HCC): ICD-10-CM

## 2025-02-11 RX ORDER — LUBIPROSTONE 24 UG/1
24 CAPSULE ORAL 2 TIMES DAILY
Qty: 56 CAPSULE | Refills: 3 | Status: SHIPPED | OUTPATIENT
Start: 2025-02-11 | End: 2025-03-11

## 2025-02-12 ENCOUNTER — HOSPITAL ENCOUNTER (OUTPATIENT)
Dept: OPERATING ROOM | Facility: CLINIC | Age: 65
Setting detail: OUTPATIENT SURGERY
Discharge: HOME | End: 2025-02-12
Payer: COMMERCIAL

## 2025-02-12 VITALS
WEIGHT: 253.97 LBS | RESPIRATION RATE: 16 BRPM | TEMPERATURE: 97.7 F | DIASTOLIC BLOOD PRESSURE: 83 MMHG | HEART RATE: 74 BPM | SYSTOLIC BLOOD PRESSURE: 139 MMHG | BODY MASS INDEX: 33.66 KG/M2 | HEIGHT: 73 IN | OXYGEN SATURATION: 96 %

## 2025-02-12 DIAGNOSIS — M46.1 BILATERAL SACROILIITIS (CMS-HCC): ICD-10-CM

## 2025-02-12 LAB — GLUCOSE BLD MANUAL STRIP-MCNC: 74 MG/DL (ref 74–99)

## 2025-02-12 PROCEDURE — 82947 ASSAY GLUCOSE BLOOD QUANT: CPT

## 2025-02-12 PROCEDURE — 3600000001 HC OR TIME - INITIAL BASE CHARGE - PROCEDURE LEVEL ONE

## 2025-02-12 PROCEDURE — 2500000004 HC RX 250 GENERAL PHARMACY W/ HCPCS (ALT 636 FOR OP/ED): Performed by: PHYSICAL MEDICINE & REHABILITATION

## 2025-02-12 PROCEDURE — 2550000001 HC RX 255 CONTRASTS: Performed by: PHYSICAL MEDICINE & REHABILITATION

## 2025-02-12 PROCEDURE — 7100000009 HC PHASE TWO TIME - INITIAL BASE CHARGE

## 2025-02-12 PROCEDURE — 3600000006 HC OR TIME - EACH INCREMENTAL 1 MINUTE - PROCEDURE LEVEL ONE

## 2025-02-12 PROCEDURE — 7100000010 HC PHASE TWO TIME - EACH INCREMENTAL 1 MINUTE

## 2025-02-12 RX ORDER — DEXAMETHASONE SODIUM PHOSPHATE 10 MG/ML
INJECTION INTRAMUSCULAR; INTRAVENOUS AS NEEDED
Status: COMPLETED | OUTPATIENT
Start: 2025-02-12 | End: 2025-02-12

## 2025-02-12 RX ORDER — FENTANYL CITRATE 50 UG/ML
INJECTION, SOLUTION INTRAMUSCULAR; INTRAVENOUS AS NEEDED
Status: COMPLETED | OUTPATIENT
Start: 2025-02-12 | End: 2025-02-12

## 2025-02-12 RX ORDER — LIDOCAINE HYDROCHLORIDE 5 MG/ML
INJECTION, SOLUTION INFILTRATION; INTRAVENOUS AS NEEDED
Status: COMPLETED | OUTPATIENT
Start: 2025-02-12 | End: 2025-02-12

## 2025-02-12 RX ADMIN — DEXAMETHASONE SODIUM PHOSPHATE 20 MG: 10 INJECTION, SOLUTION INTRAMUSCULAR; INTRAVENOUS at 12:20

## 2025-02-12 RX ADMIN — IOHEXOL 3 ML: 240 INJECTION, SOLUTION INTRATHECAL; INTRAVASCULAR; INTRAVENOUS; ORAL at 12:20

## 2025-02-12 RX ADMIN — LIDOCAINE HYDROCHLORIDE 5 ML: 5 INJECTION, SOLUTION INFILTRATION at 12:20

## 2025-02-12 RX ADMIN — FENTANYL CITRATE 100 MCG: 50 INJECTION, SOLUTION INTRAMUSCULAR; INTRAVENOUS at 12:16

## 2025-02-12 ASSESSMENT — PAIN SCALES - GENERAL
PAINLEVEL_OUTOF10: 3
PAINLEVEL_OUTOF10: 8
PAINLEVEL_OUTOF10: 3

## 2025-02-12 ASSESSMENT — PATIENT HEALTH QUESTIONNAIRE - PHQ9
SUM OF ALL RESPONSES TO PHQ9 QUESTIONS 1 AND 2: 0
1. LITTLE INTEREST OR PLEASURE IN DOING THINGS: NOT AT ALL
2. FEELING DOWN, DEPRESSED OR HOPELESS: NOT AT ALL

## 2025-02-12 ASSESSMENT — PAIN - FUNCTIONAL ASSESSMENT
PAIN_FUNCTIONAL_ASSESSMENT: 0-10

## 2025-02-12 NOTE — PRE-SEDATION DOCUMENTATION
Last time ate or drank 8 pmLN   Mouth opening  4  cm  Dentures none   Uvula seen   TCD 4 digits  Rom flexion to 50 degrees  ext to 60 degrees   No Current Cardio pulmonary symptoms   Low risk for IV anxiolysis

## 2025-02-12 NOTE — POST-PROCEDURE NOTE
Bilateral sacro iliac joint intraarticular steroid injection     Time-in: 1210 pm   time-out: 1225 pm     Sedation: 100 mcg of IV fentanyl   Patient sedated but responsive to verbal commands. Monitoring of the vitals signs performed by qualified Registered Nurse during the entire procedure  Please see sedation record for sedation by RN.      Side:  bilateral SIJ     Indication: Failure to respond to conservative treatment with therapy and medications.     PROCEDURE:    The risks, benefits and alternatives of the procedure were discussed with the patient and agreed to proceed. The risks included but not limited to: infection, bleeding, paralysis, nerve injury sepsis and remotely death were discussed with the patient during the office and again in the pre procedure area.  The patient signed informed consent in the pre procedure area.     The patient was brought to procedure room and time out for the procedure was performed with the procedure room staff present.    Patient placed in prone position on the procedure table and draped and covered appropriately.      Fluoroscopy machine was used to identify the lumbo sacral area over the  Right SIJ    Skin was prepped and draped in sterile fashion over the SIJ area.  Skin was infiltrated with local anesthetic with lidocaien 0.5%    Spinal needle #25 was introduced to the lower end of the SIJ, tip of the needle position was verified with adequate flow of contrast dye 0.5cc of Isovue in the joint space.  At that point, 10 mg dexamethasone mixed with 1 mL of lidocaine 0.5% were injected.      Same procedure repeated on the left SIJ  with 10 mg of dexamethasone  mixed with 10 mg of kenalog with 1 ml of lidocaine 0.5 %  injected in the joint    Procedure tolerated very well.  No complications encountered.  Post procedure care discussed with the patient, agreed to proceed.   Patient instructed on keeping track of the pain level after discharge.   Patient discharged home, from the  recovery room, in stable condition.

## 2025-02-12 NOTE — H&P
"History Of Present Illness  Jared Thomson is a 64 y.o. male presenting with SIJ pain bilateral .     Past Medical History  Past Medical History:   Diagnosis Date    Cervicalgia 10/07/2015    Neck pain    Personal history of other diseases of the musculoskeletal system and connective tissue     History of low back pain    Personal history of other diseases of the musculoskeletal system and connective tissue     History of low back pain    Type 2 diabetes mellitus        Surgical History  History reviewed. No pertinent surgical history.     Social History  He reports that he has never smoked. He has never used smokeless tobacco. He reports current alcohol use of about 2.0 standard drinks of alcohol per week. He reports that he does not use drugs.    Family History  No family history on file.     Allergies  Penicillins, Adhesive tape-silicones, and Latex    Review of Systems   No Current Cardio pulmonary symptoms  Denied any fever or chills. No weight loss and no night sweats. No cough or sputum production. No diarrhea   The constipation has been responding to fibers and over the counter medications.     No bladder and bowel incontinence and no other changes in bladder and bowel. No skin changes.  Reports tiredness and fatigability only if the pain is not controlled.   Denied opioids diversion and abuse and denies alcoholism. Denies overuse of the pain medications.    Physical Exam    Heart regular breathing regular  SIJ loading bilaterally  Last Recorded Vitals  Blood pressure 121/77, pulse 84, temperature 36.6 °C (97.9 °F), temperature source Temporal, resp. rate 18, height 1.854 m (6' 1\"), weight 115 kg (253 lb 15.5 oz), SpO2 96%.    Relevant Results  Current Outpatient Medications on File Prior to Encounter   Medication Sig Dispense Refill    HYDROcodone-acetaminophen (Norco) 5-325 mg tablet Take 1 tablet by mouth every 8 hours if needed for severe pain (7 - 10) for up to 28 days. Do not fill before February 5, " "2025. 84 tablet 0    [START ON 3/5/2025] HYDROcodone-acetaminophen (Norco) 5-325 mg tablet Take 1 tablet by mouth every 8 hours if needed for severe pain (7 - 10) for up to 28 days. Do not fill before March 5, 2025. 84 tablet 0    lubiprostone (Amitiza) 24 mcg capsule Take 1 capsule (24 mcg) by mouth 2 times a day for 28 days. 56 capsule 3    metFORMIN (Glucophage) 1,000 mg tablet       nabumetone (Relafen) 500 mg tablet Take 1 tablet (500 mg) by mouth 2 times a day. 60 tablet 1    rosuvastatin (Crestor) 20 mg tablet       tirzepatide 2.5 mg/0.5 mL pen injector Inject 2.5 mg under the skin once a week.      traZODone (Desyrel) 50 mg tablet Take 2 tablets (100 mg) by mouth once daily at bedtime for 28 days. 28 tablet 1    BD Ultra-Fine Orig Pen Needle 29 gauge x 1/2\" needle       FreeStyle Frederic 2 Sensor kit       Lantus Solostar U-100 Insulin 100 unit/mL (3 mL) pen       naloxegol oxalate (Movantik) 25 mg Take 1 tablet (25 mg) by mouth 1 time for 1 dose. 30 tablet 1    naloxone (Narcan) 4 mg/0.1 mL nasal spray Administer 1 spray (4 mg) into affected nostril(s) if needed.      Novofine 32 32 gauge x 1/4\" needle       [DISCONTINUED] lubiprostone (Amitiza) 24 mcg capsule Take 1 capsule (24 mcg) by mouth 2 times a day for 28 days. 56 capsule 3    [DISCONTINUED] vardenafil (Levitra) 20 mg tablet Take 1 tablet (20 mg) by mouth once daily as needed. TAKE 1 HOUR BEFORE NEEDED (Patient not taking: Reported on 12/9/2024)       No current facility-administered medications on file prior to encounter.         Assessment/Plan   Assessment & Plan  Bilateral sacroiliitis (CMS-HCC)      Bilateral SIJ intraarticular steroid injection        I spent 10 minutes in the professional and overall care of this patient.      Alex Roberts MD    "

## 2025-02-26 ENCOUNTER — HOSPITAL ENCOUNTER (OUTPATIENT)
Dept: OPERATING ROOM | Facility: CLINIC | Age: 65
Discharge: HOME | End: 2025-02-26
Payer: COMMERCIAL

## 2025-02-26 VITALS
HEART RATE: 75 BPM | SYSTOLIC BLOOD PRESSURE: 145 MMHG | OXYGEN SATURATION: 95 % | BODY MASS INDEX: 33.86 KG/M2 | DIASTOLIC BLOOD PRESSURE: 72 MMHG | HEIGHT: 72 IN | RESPIRATION RATE: 16 BRPM | WEIGHT: 250 LBS | TEMPERATURE: 97 F

## 2025-02-26 DIAGNOSIS — M46.1 BILATERAL SACROILIITIS (CMS-HCC): ICD-10-CM

## 2025-02-26 LAB — GLUCOSE BLD MANUAL STRIP-MCNC: 103 MG/DL (ref 74–99)

## 2025-02-26 PROCEDURE — 82947 ASSAY GLUCOSE BLOOD QUANT: CPT

## 2025-02-26 PROCEDURE — 2500000004 HC RX 250 GENERAL PHARMACY W/ HCPCS (ALT 636 FOR OP/ED): Performed by: PHYSICAL MEDICINE & REHABILITATION

## 2025-02-26 PROCEDURE — 7100000009 HC PHASE TWO TIME - INITIAL BASE CHARGE

## 2025-02-26 PROCEDURE — 3600000006 HC OR TIME - EACH INCREMENTAL 1 MINUTE - PROCEDURE LEVEL ONE

## 2025-02-26 PROCEDURE — 27096 INJECT SACROILIAC JOINT: CPT | Performed by: PHYSICAL MEDICINE & REHABILITATION

## 2025-02-26 PROCEDURE — 3600000001 HC OR TIME - INITIAL BASE CHARGE - PROCEDURE LEVEL ONE

## 2025-02-26 PROCEDURE — 7100000010 HC PHASE TWO TIME - EACH INCREMENTAL 1 MINUTE

## 2025-02-26 PROCEDURE — 2550000001 HC RX 255 CONTRASTS: Performed by: PHYSICAL MEDICINE & REHABILITATION

## 2025-02-26 RX ORDER — FENTANYL CITRATE 50 UG/ML
INJECTION, SOLUTION INTRAMUSCULAR; INTRAVENOUS AS NEEDED
Status: COMPLETED | OUTPATIENT
Start: 2025-02-26 | End: 2025-02-26

## 2025-02-26 RX ORDER — DEXAMETHASONE SODIUM PHOSPHATE 10 MG/ML
INJECTION INTRAMUSCULAR; INTRAVENOUS AS NEEDED
Status: COMPLETED | OUTPATIENT
Start: 2025-02-26 | End: 2025-02-26

## 2025-02-26 RX ORDER — LIDOCAINE HYDROCHLORIDE 5 MG/ML
INJECTION, SOLUTION INFILTRATION; INTRAVENOUS AS NEEDED
Status: COMPLETED | OUTPATIENT
Start: 2025-02-26 | End: 2025-02-26

## 2025-02-26 RX ADMIN — FENTANYL CITRATE 100 MCG: 50 INJECTION, SOLUTION INTRAMUSCULAR; INTRAVENOUS at 08:21

## 2025-02-26 RX ADMIN — DEXAMETHASONE SODIUM PHOSPHATE 20 MG: 10 INJECTION, SOLUTION INTRAMUSCULAR; INTRAVENOUS at 08:22

## 2025-02-26 RX ADMIN — LIDOCAINE HYDROCHLORIDE 5 ML: 5 INJECTION, SOLUTION INFILTRATION at 08:22

## 2025-02-26 RX ADMIN — IOHEXOL 3 ML: 240 INJECTION, SOLUTION INTRATHECAL; INTRAVASCULAR; INTRAVENOUS; ORAL at 08:22

## 2025-02-26 ASSESSMENT — PAIN SCALES - GENERAL
PAINLEVEL_OUTOF10: 0 - NO PAIN
PAINLEVEL_OUTOF10: 0 - NO PAIN
PAINLEVEL_OUTOF10: 7

## 2025-02-26 ASSESSMENT — COLUMBIA-SUICIDE SEVERITY RATING SCALE - C-SSRS
6. HAVE YOU EVER DONE ANYTHING, STARTED TO DO ANYTHING, OR PREPARED TO DO ANYTHING TO END YOUR LIFE?: NO
1. IN THE PAST MONTH, HAVE YOU WISHED YOU WERE DEAD OR WISHED YOU COULD GO TO SLEEP AND NOT WAKE UP?: NO
2. HAVE YOU ACTUALLY HAD ANY THOUGHTS OF KILLING YOURSELF?: NO

## 2025-02-26 ASSESSMENT — PAIN - FUNCTIONAL ASSESSMENT: PAIN_FUNCTIONAL_ASSESSMENT: 0-10

## 2025-02-26 NOTE — H&P
History Of Present Illness  Jared Thomson is a 64 y.o. male presenting with bilateral SIJ pain for intraarticular steroid injection  in  .     Past Medical History  Past Medical History:   Diagnosis Date    Cervicalgia 10/07/2015    Neck pain    Personal history of other diseases of the musculoskeletal system and connective tissue     History of low back pain    Personal history of other diseases of the musculoskeletal system and connective tissue     History of low back pain    Type 2 diabetes mellitus        Surgical History  History reviewed. No pertinent surgical history.     Social History  He reports that he has never smoked. He has never used smokeless tobacco. He reports current alcohol use of about 2.0 standard drinks of alcohol per week. He reports that he does not use drugs.    Family History  No family history on file.     Allergies  Penicillins, Adhesive tape-silicones, and Latex    Review of Systems   No Current Cardio pulmonary symptoms   Physical Exam    Heart regular breathing regular   Positive loading of SIJ   Last Recorded Vitals  Blood pressure 121/66, pulse 75, temperature 36 °C (96.8 °F), temperature source Temporal, resp. rate 16, height 1.829 m (6'), weight 113 kg (250 lb), SpO2 95%.    Relevant Results  Current Outpatient Medications on File Prior to Encounter   Medication Sig Dispense Refill    HYDROcodone-acetaminophen (Norco) 5-325 mg tablet Take 1 tablet by mouth every 8 hours if needed for severe pain (7 - 10) for up to 28 days. Do not fill before February 5, 2025. 84 tablet 0    [START ON 3/5/2025] HYDROcodone-acetaminophen (Norco) 5-325 mg tablet Take 1 tablet by mouth every 8 hours if needed for severe pain (7 - 10) for up to 28 days. Do not fill before March 5, 2025. 84 tablet 0    Lantus Solostar U-100 Insulin 100 unit/mL (3 mL) pen       lubiprostone (Amitiza) 24 mcg capsule Take 1 capsule (24 mcg) by mouth 2 times a day for 28 days. 56 capsule 3    metFORMIN (Glucophage)  "1,000 mg tablet       nabumetone (Relafen) 500 mg tablet Take 1 tablet (500 mg) by mouth 2 times a day. 60 tablet 1    rosuvastatin (Crestor) 20 mg tablet       tirzepatide 2.5 mg/0.5 mL pen injector Inject 2.5 mg under the skin once a week.      traZODone (Desyrel) 50 mg tablet Take 2 tablets (100 mg) by mouth once daily at bedtime for 28 days. 28 tablet 1    BD Ultra-Fine Orig Pen Needle 29 gauge x 1/2\" needle       FreeStyle Frederic 2 Sensor kit       naloxegol oxalate (Movantik) 25 mg Take 1 tablet (25 mg) by mouth 1 time for 1 dose. 30 tablet 1    naloxone (Narcan) 4 mg/0.1 mL nasal spray Administer 1 spray (4 mg) into affected nostril(s) if needed.      Novofine 32 32 gauge x 1/4\" needle        No current facility-administered medications on file prior to encounter.      Assessment/Plan   Assessment & Plan  Bilateral sacroiliitis (CMS-[facility])      Proceed with SIJ intraarticular steroid injection bilaterally       I spent 10 minutes in the professional and overall care of this patient.      Alex Roberts MD    "

## 2025-02-26 NOTE — POST-PROCEDURE NOTE
Bilateral sacro iliac joint intraarticular steroid injection     Time-in: 820 am   time-out: 840 am     Sedation: 100 mcg of IV fentanyl  Patient sedated but responsive to verbal commands. Monitoring of the vitals signs performed by qualified Registered Nurse during the entire procedure  Please see sedation record for sedation by RN.      Side:  bilateral     Indication: Failure to respond to conservative treatment with therapy and medications.     PROCEDURE:    The risks, benefits and alternatives of the procedure were discussed with the patient and agreed to proceed. The risks included but not limited to: infection, bleeding, paralysis, nerve injury sepsis and remotely death were discussed with the patient during the office and again in the pre procedure area.  The patient signed informed consent in the pre procedure area.     The patient was brought to procedure room and time out for the procedure was performed with the procedure room staff present.    Patient placed in prone position on the procedure table and draped and covered appropriately.      Fluoroscopy machine was used to identify the lumbo sacral area over the  right  SIJ    Skin was prepped and draped in sterile fashion over the SIJ area.  Skin was infiltrated with local anesthetic with lidocaien 0.5%    Spinal needle #25 was introduced to the lower end of the SIJ, tip of the needle position was verified with adequate flow of contrast dye 0.5cc of Isovue in the joint space.  At that point, 10 mg dexamethasone mixed with 1 mL of lidocaine 0.5% were injected.      Same procedure repeated on the left sacro iliac joint with 10 mg of dexamethasone  mixed with 1 cc of lidocaine 0.5%    Procedure tolerated very well.  No complications encountered.  Post procedure care discussed with the patient, agreed to proceed.   Patient instructed on keeping track of the pain level after discharge.   Patient discharged home, from the recovery room, in stable condition.

## 2025-02-26 NOTE — PRE-SEDATION DOCUMENTATION
Last time ate or drank 7 pm LN   Mouth opening  5  cm  Dentures none  Soft palate Uvula visible   TCD 5 digits  Rom flexion to 60 degrees  ext to 60 degrees   No Current Cardio pulmonary symptoms   Low risk for IV anxiolysis

## 2025-02-26 NOTE — DISCHARGE INSTRUCTIONS
Discharge Instructions for Anesthesiology Pain Service Patients - Dr. Roberts    Observe the needle site for excessive bleeding (slow general oozing that completely soaks the dressing or fresh bright red bleeding).  In either case, apply pressure to the area, elevate it if possible and call your doctor at once.    Observe/monitor for the following signs and symptoms:         Needle site:  change in color, numbness or tingling, coldness to touch, swelling, drainage       Temperature of 101.5 or higher       Increased or uncontrollable pain    If you notice any of the above signs or symptoms, please call your doctor at once.    Your pain may not be gone immediately after this procedure; it generally takes 3 to 5 days for the steroid to work.    Keep the needle site clean and dry for 24 hours.    Continue your present medications.    No driving or operating machinery for 24 hours if you have received sedation.    Make an appointment to see you doctor in 2-3 weeks    Central Scheduling Number:  [phone]  After hours Pain Management:  [phone].    If any problems occur, or if you have further questions, please call you doctor as soon as possible.  If you find that you cannot reach your doctor, but feel that your condition needs a doctors attention, go to the  emergency room nearest your home.            Discharge home when Vital signs are stable and back to pre procedure status  Keep injection site covered until next day  May shower today but no bath tub or pool until next day, keep injection site dry until tomorrow. If band aid fall off, no need to replace it.  Please record the level of pain starting today until your follow up.  Call office immediately with any increase in pain or redness or bleeding at injection site.   Please not note that some skin flushing mainly on the face can occur for few days after injection. You may experience some increase in the pain you the injection for. But, if that happens, it  should last only a day or two  Call office if you start having any unusual symptoms with headaches, Nausa, Vomitting Diarrhea confusion inability to focus etc...  Call office for appointment in one week

## 2025-03-05 ENCOUNTER — APPOINTMENT (OUTPATIENT)
Dept: PAIN MEDICINE | Facility: CLINIC | Age: 65
End: 2025-03-05
Payer: COMMERCIAL

## 2025-03-05 DIAGNOSIS — M46.1 BILATERAL SACROILIITIS (CMS-HCC): ICD-10-CM

## 2025-03-05 DIAGNOSIS — S22.41XA CLOSED FRACTURE OF FOUR RIBS OF RIGHT SIDE, INITIAL ENCOUNTER: ICD-10-CM

## 2025-03-05 DIAGNOSIS — S31.000A OPEN TRAUMATIC SUBLUXATION OF PELVIS, INITIAL ENCOUNTER: ICD-10-CM

## 2025-03-05 DIAGNOSIS — S32.10XA CLOSED FRACTURE OF SACRUM, UNSPECIFIED PORTION OF SACRUM, INITIAL ENCOUNTER (MULTI): ICD-10-CM

## 2025-03-05 DIAGNOSIS — S46.011A RUPTURE OF RIGHT SUPRASPINATUS TENDON, INITIAL ENCOUNTER: ICD-10-CM

## 2025-03-05 DIAGNOSIS — M43.27 ANKYLOSIS OF LUMBOSACRAL JOINT: ICD-10-CM

## 2025-03-05 DIAGNOSIS — S33.30XA OPEN TRAUMATIC SUBLUXATION OF PELVIS, INITIAL ENCOUNTER: ICD-10-CM

## 2025-03-05 RX ORDER — LUBIPROSTONE 24 UG/1
24 CAPSULE ORAL 2 TIMES DAILY
Qty: 56 CAPSULE | Refills: 3 | Status: SHIPPED | OUTPATIENT
Start: 2025-03-05 | End: 2025-04-02

## 2025-03-05 RX ORDER — HYDROCODONE BITARTRATE AND ACETAMINOPHEN 5; 325 MG/1; MG/1
1 TABLET ORAL EVERY 8 HOURS PRN
Qty: 84 TABLET | Refills: 0 | Status: SHIPPED | OUTPATIENT
Start: 2025-03-11 | End: 2025-04-08

## 2025-03-05 RX ORDER — TRAZODONE HYDROCHLORIDE 50 MG/1
100 TABLET ORAL NIGHTLY
Qty: 56 TABLET | Refills: 1 | Status: SHIPPED | OUTPATIENT
Start: 2025-03-05 | End: 2025-04-02

## 2025-03-05 RX ORDER — HYDROCODONE BITARTRATE AND ACETAMINOPHEN 5; 325 MG/1; MG/1
1 TABLET ORAL EVERY 8 HOURS PRN
Qty: 84 TABLET | Refills: 0 | Status: SHIPPED
Start: 2025-04-08 | End: 2025-03-05 | Stop reason: ALTCHOICE

## 2025-03-05 NOTE — PROGRESS NOTES
[facility] [id number]  DOI 1.18.2007      · Rupture of supraspinatus tendon (840.4) (S46.019A)   · Open traumatic subluxation of pelvis, initial encounter (839.79) (S33.30XA,S31.000A)   · Bilateral sacroiliitis (720.2) (M46.1)   · Ankylosis of lumbosacral joint (724.6) (M43.27)      Chief complaint  Back pain     Verenda E LPN,   was present during the entire history and physical examination    History  Jared Thomson is back for pain management office visit  He had a sacroiliac joint injection and the pain is much better controlled now he is able to walk better.  Usually the sacroiliac joint injection helped him was over 95% of the pain and that lasted for 6 to 8 months.  This is is helping him very well.  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 weighing over 70 to 80 pounds and he is on concrete I discussed with him about getting well cushioned shoes to help him observe the shock from the heel    Of note that we have been trying to cut back on the medication and we have been so successful doing so after we gave him acupuncture and the injection when he came to us he was taken long-acting medication along with short acting medication we have been able to cut those progressively currently taking only the short acting medication at 3 tablets a day  Detailed discussion and explanation about the need to try  and cut back on pain medications.  Entertained question about   pain level changing from acute, subacute to chronic pain.  Currently the pain and chronic.  The higher amount of medication were for the acute and subacute phase.  Therefore   we do not know exactly if the medications at this amount are still needed until we try and cut back slowly on pain medications. If the cut is tolerated then we  continue trying to cut back further. If cut not tolerated then, will try additional none chemical methods like injection or physical therapy or we can go back on the pain medications level.  The goal from this is to keep the pain medications at the lowest effective dose and to control the tolerance that develops from the chronic use of opioid therapy.  Our goal is to keep the pain controlled with minimal effective dose.  That will help cutting back on the potential for side effects, and also will help decrease the amount of tolerance developing from the chronic use of opioid medications.  Pain level without medication is 6 or 7/10 , with the medication pain level between 2 and 3/10.     Pain disability index (PDI) improvement by 3-4 points, across different functional categories, with the pain control with the meds. Forms filled by patient are scanned in the chart    The pain meds are helping control the pain and improving Activities of Daily living and quality of life and quality of sleep.    opioids treatment agreement jan 2025    Pill count today, using count tray, and in front of patient, Nurse and myself :  8    pills , last fill was on 2/11  for 84 tabs,  the meds pill count today is correct  Oarrs pulled and reviewed, no concerns  last urine toxicology testing was compliant this was done on : Sept 2024  Xray updated spine  ORT (opioid risk tool) score is 0  Pain pathology and pain generators spine  Modalities tried injection, surgery, physical therapy, TENS unit, nonsteroidal anti-inflammatory medication multiple injection and surgical fusion    Review of Systems :  Denied any fever or chills. No weight loss and no night sweats. No cough or sputum production. No diarrhea   The constipation has been responding to fibers and over the counter medications.     No bladder and bowel incontinence and no other changes in bladder and bowel. No skin changes.  Reports tiredness and fatigability only if the pain is not  "controlled.     Denied opioids diversion and abuse and denies alcoholism. Denies overuse of the pain medications.  No reported euphoria sensation or getting a \"high\" on the pain medications.    The control of the pain with the pain medications is helping the control of the symptoms and allowing the function and activities of daily living, enjoyment of life, improving the quality of life and sleep with less interruption by the pain. The goal is symptomatic control of the nonmalignant chronic pain and not to repair the permanent damage in the tissues inducing the chronic pain conditions. We are aiming to shift the focus from the nonmalignant chronic pain to other aspects of life by symptomatically treating this chronic pain. If this pain is not treated it will lead to major morbidity and it is also associated with increased risks of mortality. The patient understands those very clearly and also understand high risks of morbidity and mortality if not strictly adherent to the treatment recommendations and reporting any associated side effects. Also patient understand the full responsibility associated with these medications to avoid abuse or overuse or any use of these medications for anything besides treating the patient's own chronic pain and nothing else under any circumstances.        Physical examination  Awake, alert and oriented for time place and persons   Pupils are equal and reactive to light and accommodation    Negative loading of the sacroiliac joint walking with normal gait no cane no assistive devices  Positive provocative testing of the lower lumbar facets straight leg raising increase the pain in the back negative for then testing  Right shoulder impingement at 100 degrees no cervical root tension signs  Diagnosis  Problem List Items Addressed This Visit       Ankylosis of lumbosacral joint    Relevant Medications    HYDROcodone-acetaminophen (Norco) 5-325 mg tablet (Start on 3/11/2025)    traZODone " (Desyrel) 50 mg tablet    lubiprostone (Amitiza) 24 mcg capsule    Bilateral sacroiliitis (CMS-HCC)    Relevant Medications    HYDROcodone-acetaminophen (Norco) 5-325 mg tablet (Start on 3/11/2025)    traZODone (Desyrel) 50 mg tablet    lubiprostone (Amitiza) 24 mcg capsule    Closed fracture of four ribs    Relevant Medications    HYDROcodone-acetaminophen (Norco) 5-325 mg tablet (Start on 3/11/2025)    traZODone (Desyrel) 50 mg tablet    lubiprostone (Amitiza) 24 mcg capsule    Closed fracture of sacrum (Multi)    Relevant Medications    HYDROcodone-acetaminophen (Norco) 5-325 mg tablet (Start on 3/11/2025)    traZODone (Desyrel) 50 mg tablet    lubiprostone (Amitiza) 24 mcg capsule    Open traumatic subluxation of pelvis    Relevant Medications    HYDROcodone-acetaminophen (Norco) 5-325 mg tablet (Start on 3/11/2025)    traZODone (Desyrel) 50 mg tablet    lubiprostone (Amitiza) 24 mcg capsule    Rupture of supraspinatus tendon    Relevant Medications    HYDROcodone-acetaminophen (Norco) 5-325 mg tablet (Start on 3/11/2025)    traZODone (Desyrel) 50 mg tablet    lubiprostone (Amitiza) 24 mcg capsule        Plan  Reviewed the pain generators.  Went over the types of pain with neuropathic and nociceptive and different pathologies and therapeutic modalities. Discussed the mechanism of action of interventions from acupuncture, physical therapy , regular exercises, injections, botox, spinal cord stimulation, and role of surgery     Went over pathology of the intervertebral disc displacement and the anatomical relation to the Nerve roots and relation to the radicular symptoms. Went over treatment modalities with conservative treatment including acupuncture   and epidural steroid injection with fluoroscopy guidance and last resort of surgery    Based on the above findings and the clinical response to the opioids medications and improvement of the activities of daily living, sleep, and work performance. We made this  complex decision to continue the opioids therapy in light of the evidence of the patient's responsibility in using the pain medications as prescribed for the nonmalignant chronic pain condition. We discussed about the use of the pain medications to treat the symptoms of chronic nonmalignant pain and we are not trying the repair the permanent damage in the tissues, rather we are trying to control the symptoms induced by the permanent damage to the tissues inducing the chronic pain condition and resulting disability. I explained the difference and discussed it with the patient and stressed the importance of knowing the difference especially because of the potential side effects and the potential addicting effect and habit forming nature of the dangerous drugs we are using to treat the symptoms of the chronic pain.      We discussed that we are prescribing the medications on good leo and legitimate medical reason within the scope of professional medical practice.     We reviewed the side effects and precautions of opioids prescriptions as discussed in the opioids treatment agreement.    realizes the interaction between the therapeutic classes including the respiratory depression and potential death     Random drug testing   we will submit     At this time we will hold off on injection we will keep the medication we will try to cut back in the next visit with the weather warming up continue with anti-inflammatory for opioid sparing effect consider acupuncture for aggravation of the pain    Discussed about NSAIDS and I explained about the opioids sparing effect to allow keeping the opioids dose at minimal effective dose.   I went over the potential side effects of the NSAIDS on the gastrointestinal, renal and cardiovascular systems.      I detailed the side effects from the acetaminophen in the medication and made aware of those. I also explained about the cumulative effects on the organs and mainly the liver.      Given the opioids therapy , we discussed about the risk for accidental over dose on the pain medications, either for patient or other household. I went over the mechanism of action and mode of use of the Naloxone according to the  recommendations. I will provide a prescription for a kit.     Follow-up 4  weeks or earlier if needed     The level of clinical decision making in this office visit,  is high, given the high risks of complications with the morbidity and mortality due to the fact that acute and chronic pain may pose a threat to life and bodily function, if under treated, poorly treated, or with failure to maintain adequate treatment and timely medical follow up. Additionally over treatment has its own set of complications including overdosing on the pain medications and also the habit forming potentials with the use of the medications used to treat chronic painful conditions including therapeutic classes classified as dangerous medications. Given the serious and fluctuating nature of pain level and instensity with extensive consideration for whenever pain changes, there is always the risk of prolonged functional impairment requiring close patient monitoring with regular assessments and reassessments and high level medical decision making at every office visit. The amount and complexity of data reviewed is high given the patient clinical presentation, labs,  data, radiology reports, and other tests as discussed during office visits. Pertinent data whether positive or negative were taken in consideration in the process of making this high level medical decision.

## 2025-03-31 ENCOUNTER — APPOINTMENT (OUTPATIENT)
Dept: PAIN MEDICINE | Facility: CLINIC | Age: 65
End: 2025-03-31
Payer: COMMERCIAL

## 2025-04-03 ENCOUNTER — APPOINTMENT (OUTPATIENT)
Dept: PAIN MEDICINE | Facility: CLINIC | Age: 65
End: 2025-04-03
Payer: COMMERCIAL

## 2025-04-07 ENCOUNTER — APPOINTMENT (OUTPATIENT)
Dept: PAIN MEDICINE | Facility: CLINIC | Age: 65
End: 2025-04-07
Payer: COMMERCIAL

## 2025-04-07 DIAGNOSIS — M43.27 ANKYLOSIS OF LUMBOSACRAL JOINT: Primary | ICD-10-CM

## 2025-04-07 DIAGNOSIS — S33.30XA OPEN TRAUMATIC SUBLUXATION OF PELVIS, INITIAL ENCOUNTER: ICD-10-CM

## 2025-04-07 DIAGNOSIS — S31.000A OPEN TRAUMATIC SUBLUXATION OF PELVIS, INITIAL ENCOUNTER: ICD-10-CM

## 2025-04-07 DIAGNOSIS — S46.011A RUPTURE OF RIGHT SUPRASPINATUS TENDON, INITIAL ENCOUNTER: ICD-10-CM

## 2025-04-07 DIAGNOSIS — S22.41XA CLOSED FRACTURE OF FOUR RIBS OF RIGHT SIDE, INITIAL ENCOUNTER: ICD-10-CM

## 2025-04-07 DIAGNOSIS — M46.1 BILATERAL SACROILIITIS: ICD-10-CM

## 2025-04-07 DIAGNOSIS — S32.10XA CLOSED FRACTURE OF SACRUM, UNSPECIFIED PORTION OF SACRUM, INITIAL ENCOUNTER (MULTI): ICD-10-CM

## 2025-04-07 RX ORDER — LUBIPROSTONE 24 UG/1
24 CAPSULE ORAL 2 TIMES DAILY
Qty: 56 CAPSULE | Refills: 3 | Status: SHIPPED | OUTPATIENT
Start: 2025-04-07 | End: 2025-05-05

## 2025-04-07 RX ORDER — NABUMETONE 500 MG/1
500 TABLET, FILM COATED ORAL 2 TIMES DAILY
Qty: 60 TABLET | Refills: 1 | Status: SHIPPED | OUTPATIENT
Start: 2025-04-07 | End: 2026-04-07

## 2025-04-07 RX ORDER — HYDROCODONE BITARTRATE AND ACETAMINOPHEN 5; 325 MG/1; MG/1
1 TABLET ORAL EVERY 8 HOURS PRN
Qty: 84 TABLET | Refills: 0 | Status: SHIPPED | OUTPATIENT
Start: 2025-04-08 | End: 2025-05-06

## 2025-04-07 RX ORDER — TRAZODONE HYDROCHLORIDE 50 MG/1
100 TABLET ORAL NIGHTLY
Qty: 56 TABLET | Refills: 1 | Status: SHIPPED | OUTPATIENT
Start: 2025-04-07 | End: 2025-05-05

## 2025-04-07 NOTE — PROGRESS NOTES
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  He had multiple trauma including the shoulder fracture of the pelvis and injury to the lower spine.  He is fused in the pelvis and not as flexible.  He have low tolerance of standing and walking on hard surfaces.  He is wearing well cushioned shoes and works on short absorbing mats at work however still being on his feet for prolonged period of time still take a toll on his back  The last injections helped with the aggravation of the sacroiliac joint and he is currently stable.  We are using the injection to help with episodes of aggravation of the pain  Pain level without medication is 7 or 8/10 , with the medication pain level 2 or 3/10.   Of note that in the past we gave him acupuncture and he was able to cut back the long-acting formulation currently taking lower amount of short acting hydrocodone  Pain disability index (PDI) improvement by 4-5 points, across different functional categories, with the pain control with the meds. Forms filled by patient are scanned in the chart    The pain meds are helping control the pain and improving Activities of Daily living and quality of life and quality of sleep.    opioids treatment agreement Nathaniel  "2025    Pill count today, using count tray, and in front of patient, Nurse and myself :  1    pills , last fill was on 3/11  for 84 tabs,  the meds pill count today is correct  Oarrs pulled and reviewed, no concerns  last urine toxicology testing was compliant this was done on : September 2024 we will request additional UDS  Xray updated spine  ORT (opioid risk tool) score is 0  Pain pathology and pain generators spine pelvic and shoulder  Modalities tried injection, surgery, physical therapy, TENS unit, nonsteroidal anti-inflammatory medication multiple injection and fusion surgery    Review of Systems :  Denied any fever or chills. No weight loss and no night sweats. No cough or sputum production. No diarrhea   The constipation has been responding to fibers and over the counter medications.     No bladder and bowel incontinence and no other changes in bladder and bowel. No skin changes.  Reports tiredness and fatigability only if the pain is not controlled.     Denied opioids diversion and abuse and denies alcoholism. Denies overuse of the pain medications.  No reported euphoria sensation or getting a \"high\" on the pain medications.    The control of the pain with the pain medications is helping the control of the symptoms and allowing the function and activities of daily living, enjoyment of life, improving the quality of life and sleep with less interruption by the pain. The goal is symptomatic control of the nonmalignant chronic pain and not to repair the permanent damage in the tissues inducing the chronic pain conditions. We are aiming to shift the focus from the nonmalignant chronic pain to other aspects of life by symptomatically treating this chronic pain. If this pain is not treated it will lead to major morbidity and it is also associated with increased risks of mortality. The patient understands those very clearly and also understand high risks of morbidity and mortality if not strictly adherent to the " treatment recommendations and reporting any associated side effects. Also patient understand the full responsibility associated with these medications to avoid abuse or overuse or any use of these medications for anything besides treating the patient's own chronic pain and nothing else under any circumstances.        Physical examination  Awake, alert and oriented for time place and persons   Pupils are equal and reactive to light and accommodation    Loading of the sacroiliac joint is equivocal today.  But increased pain upon deep pressure  No apparent pain behavior Abdirahman testing are negative  Loading of the lumbar facet increase the pain in the lower back negative straight leg raising  Plantar cutaneous are downgoing on both sides  Diagnosis  Problem List Items Addressed This Visit       Ankylosis of lumbosacral joint - Primary    Relevant Medications    HYDROcodone-acetaminophen (Norco) 5-325 mg tablet    lubiprostone (Amitiza) 24 mcg capsule    nabumetone (Relafen) 500 mg tablet    traZODone (Desyrel) 50 mg tablet    Bilateral sacroiliitis    Relevant Medications    HYDROcodone-acetaminophen (Norco) 5-325 mg tablet    lubiprostone (Amitiza) 24 mcg capsule    nabumetone (Relafen) 500 mg tablet    traZODone (Desyrel) 50 mg tablet    Closed fracture of four ribs    Relevant Medications    HYDROcodone-acetaminophen (Norco) 5-325 mg tablet    lubiprostone (Amitiza) 24 mcg capsule    nabumetone (Relafen) 500 mg tablet    traZODone (Desyrel) 50 mg tablet    Closed fracture of sacrum (Multi)    Relevant Medications    HYDROcodone-acetaminophen (Norco) 5-325 mg tablet    lubiprostone (Amitiza) 24 mcg capsule    nabumetone (Relafen) 500 mg tablet    traZODone (Desyrel) 50 mg tablet    Open traumatic subluxation of pelvis    Relevant Medications    HYDROcodone-acetaminophen (Norco) 5-325 mg tablet    lubiprostone (Amitiza) 24 mcg capsule    nabumetone (Relafen) 500 mg tablet    traZODone (Desyrel) 50 mg tablet    Rupture  of supraspinatus tendon    Relevant Medications    HYDROcodone-acetaminophen (Norco) 5-325 mg tablet    lubiprostone (Amitiza) 24 mcg capsule    nabumetone (Relafen) 500 mg tablet    traZODone (Desyrel) 50 mg tablet        Plan  Reviewed the pain generators.  Went over the types of pain with neuropathic and nociceptive and different pathologies and therapeutic modalities. Discussed the mechanism of action of interventions from acupuncture, physical therapy , regular exercises, injections, botox, spinal cord stimulation, and role of surgery     Went over pathology of the intervertebral disc displacement and the anatomical relation to the Nerve roots and relation to the radicular symptoms. Went over treatment modalities with conservative treatment including acupuncture   and epidural steroid injection with fluoroscopy guidance and last resort of surgery    Based on the above findings and the clinical response to the opioids medications and improvement of the activities of daily living, sleep, and work performance. We made this complex decision to continue the opioids therapy in light of the evidence of the patient's responsibility in using the pain medications as prescribed for the nonmalignant chronic pain condition. We discussed about the use of the pain medications to treat the symptoms of chronic nonmalignant pain and we are not trying the repair the permanent damage in the tissues, rather we are trying to control the symptoms induced by the permanent damage to the tissues inducing the chronic pain condition and resulting disability. I explained the difference and discussed it with the patient and stressed the importance of knowing the difference especially because of the potential side effects and the potential addicting effect and habit forming nature of the dangerous drugs we are using to treat the symptoms of the chronic pain.      We discussed that we are prescribing the medications on good leo and  legitimate medical reason within the scope of professional medical practice.     We reviewed the side effects and precautions of opioids prescriptions as discussed in the opioids treatment agreement.    realizes the interaction between the therapeutic classes including the respiratory depression and potential death     Random drug testing   we will submit     We will consider injection for aggravation of the symptoms in the meanwhile we will continue with low amount of pain medications as well as with adjuvant therapy for opioid sparing effect he is on hydrocodone 5 mg using 3 tablets a day he stopped long-acting formulation earlier in the year    Discussed about NSAIDS and I explained about the opioids sparing effect to allow keeping the opioids dose at minimal effective dose.   I went over the potential side effects of the NSAIDS on the gastrointestinal, renal and cardiovascular systems.      I detailed the side effects from the acetaminophen in the medication and made aware of those. I also explained about the cumulative effects on the organs and mainly the liver.     Given the opioids therapy , we discussed about the risk for accidental over dose on the pain medications, either for patient or other household. I went over the mechanism of action and mode of use of the Naloxone according to the  recommendations. I will provide a prescription for a kit.     Follow-up 8 weeks or earlier if needed     The level of clinical decision making in this office visit,  is high, given the high risks of complications with the morbidity and mortality due to the fact that acute and chronic pain may pose a threat to life and bodily function, if under treated, poorly treated, or with failure to maintain adequate treatment and timely medical follow up. Additionally over treatment has its own set of complications including overdosing on the pain medications and also the habit forming potentials with the use of the  medications used to treat chronic painful conditions including therapeutic classes classified as dangerous medications. Given the serious and fluctuating nature of pain level and instensity with extensive consideration for whenever pain changes, there is always the risk of prolonged functional impairment requiring close patient monitoring with regular assessments and reassessments and high level medical decision making at every office visit. The amount and complexity of data reviewed is high given the patient clinical presentation, labs,  data, radiology reports, and other tests as discussed during office visits. Pertinent data whether positive or negative were taken in consideration in the process of making this high level medical decision.

## 2025-05-05 ENCOUNTER — APPOINTMENT (OUTPATIENT)
Dept: PAIN MEDICINE | Facility: CLINIC | Age: 65
End: 2025-05-05
Payer: COMMERCIAL

## 2025-05-05 DIAGNOSIS — S32.10XA CLOSED FRACTURE OF SACRUM, UNSPECIFIED PORTION OF SACRUM, INITIAL ENCOUNTER (MULTI): ICD-10-CM

## 2025-05-05 DIAGNOSIS — S33.30XA OPEN TRAUMATIC SUBLUXATION OF PELVIS, INITIAL ENCOUNTER: ICD-10-CM

## 2025-05-05 DIAGNOSIS — S22.41XA CLOSED FRACTURE OF FOUR RIBS OF RIGHT SIDE, INITIAL ENCOUNTER: ICD-10-CM

## 2025-05-05 DIAGNOSIS — S46.011A RUPTURE OF RIGHT SUPRASPINATUS TENDON, INITIAL ENCOUNTER: ICD-10-CM

## 2025-05-05 DIAGNOSIS — M43.27 ANKYLOSIS OF LUMBOSACRAL JOINT: ICD-10-CM

## 2025-05-05 DIAGNOSIS — S31.000A OPEN TRAUMATIC SUBLUXATION OF PELVIS, INITIAL ENCOUNTER: ICD-10-CM

## 2025-05-05 DIAGNOSIS — M46.1 BILATERAL SACROILIITIS: ICD-10-CM

## 2025-05-05 PROCEDURE — 99214 OFFICE O/P EST MOD 30 MIN: CPT | Performed by: PHYSICAL MEDICINE & REHABILITATION

## 2025-05-05 RX ORDER — NABUMETONE 500 MG/1
500 TABLET, FILM COATED ORAL 2 TIMES DAILY
Qty: 60 TABLET | Refills: 1 | Status: SHIPPED | OUTPATIENT
Start: 2025-05-05 | End: 2026-05-05

## 2025-05-05 RX ORDER — LUBIPROSTONE 24 UG/1
24 CAPSULE ORAL 2 TIMES DAILY
Qty: 56 CAPSULE | Refills: 3 | Status: SHIPPED | OUTPATIENT
Start: 2025-05-05 | End: 2025-06-02

## 2025-05-05 RX ORDER — HYDROCODONE BITARTRATE AND ACETAMINOPHEN 5; 325 MG/1; MG/1
1 TABLET ORAL EVERY 8 HOURS PRN
Qty: 84 TABLET | Refills: 0 | Status: SHIPPED | OUTPATIENT
Start: 2025-06-04 | End: 2025-07-02

## 2025-05-05 RX ORDER — TRAZODONE HYDROCHLORIDE 50 MG/1
100 TABLET ORAL NIGHTLY
Qty: 56 TABLET | Refills: 1 | Status: SHIPPED | OUTPATIENT
Start: 2025-05-05 | End: 2025-06-02

## 2025-05-05 RX ORDER — HYDROCODONE BITARTRATE AND ACETAMINOPHEN 5; 325 MG/1; MG/1
1 TABLET ORAL EVERY 8 HOURS PRN
Qty: 84 TABLET | Refills: 0 | Status: SHIPPED | OUTPATIENT
Start: 2025-05-07 | End: 2025-06-04

## 2025-05-05 ASSESSMENT — ENCOUNTER SYMPTOMS
DEPRESSION: 0
LOSS OF SENSATION IN FEET: 1
OCCASIONAL FEELINGS OF UNSTEADINESS: 0

## 2025-05-05 ASSESSMENT — ANXIETY QUESTIONNAIRES
GAD7 TOTAL SCORE: 0
IF YOU CHECKED OFF ANY PROBLEMS ON THIS QUESTIONNAIRE, HOW DIFFICULT HAVE THESE PROBLEMS MADE IT FOR YOU TO DO YOUR WORK, TAKE CARE OF THINGS AT HOME, OR GET ALONG WITH OTHER PEOPLE: NOT DIFFICULT AT ALL
5. BEING SO RESTLESS THAT IT IS HARD TO SIT STILL: NOT AT ALL
4. TROUBLE RELAXING: NOT AT ALL
6. BECOMING EASILY ANNOYED OR IRRITABLE: NOT AT ALL
1. FEELING NERVOUS, ANXIOUS, OR ON EDGE: NOT AT ALL
7. FEELING AFRAID AS IF SOMETHING AWFUL MIGHT HAPPEN: NOT AT ALL
2. NOT BEING ABLE TO STOP OR CONTROL WORRYING: NOT AT ALL
3. WORRYING TOO MUCH ABOUT DIFFERENT THINGS: NOT AT ALL

## 2025-05-05 NOTE — PROGRESS NOTES
[facility] [id number]  DOI 1.18.2007      · Rupture of supraspinatus tendon (840.4) (S46.019A)   · Open traumatic subluxation of pelvis, initial encounter (839.79) (S33.30XA,S31.000A)   · Bilateral sacroiliitis (720.2) (M46.1)   · Ankylosis of lumbosacral joint (724.6) (M43.27)      Chief complaint  Back pain   R shoulder pain and limited ROM     Verniharika DAVIS LPN,   was present during the entire history and physical examination    History  Jared Thomson is back for pain management office visit  The SIJ intraarticular steroid injection in Feb helped with pain control and those are still controlled he is able to cut back on the pain   The R shoulder is stable.  He gets injection for the aggravation.  The pain in the back is deep achy stabbing worse with movement.  The intensity is controlled after the injection.  There is no radiation of the pain to the lower limb.  The pain is over the sacroiliac joint below the waistline.  He has been walking on hard surface.  He does work on a cushioned mats and he does wear well cushioned shoes however being on his feet 8 hours a day that takes a toll on his back.  Currently the pain is controlled since after the injection.  Of note that he has been able to cut back on the medication by removing the long-acting formulation as well as a short acting formulation a few years ago.  He is continuing to try to cut back on the medication.  Discussed with him about spinal cord stimulator but he wanted to hold off at this time    Pain level without medication is 8/10 , with the medication pain level 2-3/10.     Pain disability index (PDI) improvement   across different functional categories, with the pain control with the meds. Forms filled by patient are scanned in the chart    The pain meds are helping control the pain and improving Activities of Daily living and quality of life and quality of sleep.    opioids treatment agreement January 2025    Pill count today, using count tray, and in  "front of patient, Nurse and myself :  9    pills , last fill was on 4/8  for 84 tabs,  the meds pill count today is correct he is trying to take 2 to 3 pills but he does not want to adjust dose yet.  Oarrs pulled and reviewed, no concerns  last urine toxicology testing was compliant this was done on : Sep 2024. He is approved to repeat   Xray updated spine  ORT (opioid risk tool) score is 0  Pain pathology and pain generators spine  Modalities tried injection, surgery, physical therapy, TENS unit, nonsteroidal anti-inflammatory medication       Review of Systems :  Denied any fever or chills. No weight loss and no night sweats. No cough or sputum production. No diarrhea   The constipation has not been responding to fibers and over the counter medications.  He is using Amitiza and that is helping better with the constipation    No bladder and bowel incontinence and no other changes in bladder and bowel. No skin changes.  Reports tiredness and fatigability only if the pain is not controlled.     I further Asked about symptoms or changes affecting the vision, hearing, breathing, digestive system, urinary symptoms, skin, other musculoskeletal condition, neurological conditions these are negative except as detailed in the history and physical examination above    Denied opioids diversion and abuse and denies alcoholism. Denies overuse of the pain medications.  No reported euphoria sensation or getting a \"high\" on the pain medications.    The control of the pain with the pain medications is helping the control of the symptoms and allowing the function and activities of daily living, enjoyment of life, improving the quality of life and sleep with less interruption by the pain. The goal is symptomatic control of the nonmalignant chronic pain and not to repair the permanent damage in the tissues inducing the chronic pain conditions. We are aiming to shift the focus from the nonmalignant chronic pain to other aspects of life " by symptomatically treating this chronic pain. If this pain is not treated it will lead to major morbidity and it is also associated with increased risks of mortality. The patient understands those very clearly and also understand high risks of morbidity and mortality if not strictly adherent to the treatment recommendations and reporting any associated side effects. Also patient understand the full responsibility associated with these medications to avoid abuse or overuse or any use of these medications for anything besides treating the patient's own chronic pain and nothing else under any circumstances.        Physical examination  Awake, alert and oriented for time place and persons   Pupils are equal and reactive to light and accommodation    Loading of the sacroiliac joint is equivocal today.  Straight leg raising increase the pain in the back.  No overt radicular symptoms to the lower limb.  Right shoulder showed impingement at 130 degrees.  No cervical root tension sign.    Diagnosis  Problem List Items Addressed This Visit       Ankylosis of lumbosacral joint    Relevant Medications    HYDROcodone-acetaminophen (Norco) 5-325 mg tablet (Start on 5/7/2025)    HYDROcodone-acetaminophen (Norco) 5-325 mg tablet (Start on 6/4/2025)    nabumetone (Relafen) 500 mg tablet    traZODone (Desyrel) 50 mg tablet    lubiprostone (Amitiza) 24 mcg capsule    Bilateral sacroiliitis    Relevant Medications    HYDROcodone-acetaminophen (Norco) 5-325 mg tablet (Start on 5/7/2025)    HYDROcodone-acetaminophen (Norco) 5-325 mg tablet (Start on 6/4/2025)    nabumetone (Relafen) 500 mg tablet    traZODone (Desyrel) 50 mg tablet    lubiprostone (Amitiza) 24 mcg capsule    Closed fracture of four ribs    Relevant Medications    HYDROcodone-acetaminophen (Norco) 5-325 mg tablet (Start on 5/7/2025)    HYDROcodone-acetaminophen (Norco) 5-325 mg tablet (Start on 6/4/2025)    nabumetone (Relafen) 500 mg tablet    traZODone (Desyrel) 50 mg  tablet    lubiprostone (Amitiza) 24 mcg capsule    Closed fracture of sacrum (Multi)    Relevant Medications    HYDROcodone-acetaminophen (Norco) 5-325 mg tablet (Start on 5/7/2025)    HYDROcodone-acetaminophen (Norco) 5-325 mg tablet (Start on 6/4/2025)    nabumetone (Relafen) 500 mg tablet    traZODone (Desyrel) 50 mg tablet    lubiprostone (Amitiza) 24 mcg capsule    Open traumatic subluxation of pelvis    Relevant Medications    HYDROcodone-acetaminophen (Norco) 5-325 mg tablet (Start on 5/7/2025)    HYDROcodone-acetaminophen (Norco) 5-325 mg tablet (Start on 6/4/2025)    nabumetone (Relafen) 500 mg tablet    traZODone (Desyrel) 50 mg tablet    lubiprostone (Amitiza) 24 mcg capsule    Rupture of supraspinatus tendon    Relevant Medications    HYDROcodone-acetaminophen (Norco) 5-325 mg tablet (Start on 5/7/2025)    HYDROcodone-acetaminophen (Norco) 5-325 mg tablet (Start on 6/4/2025)    nabumetone (Relafen) 500 mg tablet    traZODone (Desyrel) 50 mg tablet    lubiprostone (Amitiza) 24 mcg capsule        Plan  Reviewed the pain generators.  Went over the types of pain with neuropathic and nociceptive and different pathologies and therapeutic modalities. Discussed the mechanism of action of interventions from acupuncture, physical therapy , regular exercises, injections, botox, spinal cord stimulation, and role of surgery     Went over pathology of the intervertebral disc displacement and the anatomical relation to the Nerve roots and relation to the radicular symptoms. Went over treatment modalities with conservative treatment including acupuncture   and epidural steroid injection with fluoroscopy guidance and last resort of surgery    Based on the above findings and the clinical response to the opioids medications and improvement of the activities of daily living, sleep, and work performance. We made this complex decision to continue the opioids therapy in light of the evidence of the patient's responsibility in  using the pain medications as prescribed for the nonmalignant chronic pain condition. We discussed about the use of the pain medications to treat the symptoms of chronic nonmalignant pain and we are not trying the repair the permanent damage in the tissues, rather we are trying to control the symptoms induced by the permanent damage to the tissues inducing the chronic pain condition and resulting disability. I explained the difference and discussed it with the patient and stressed the importance of knowing the difference especially because of the potential side effects and the potential addicting effect and habit forming nature of the dangerous drugs we are using to treat the symptoms of the chronic pain.      We discussed that we are prescribing the medications on good leo and legitimate medical reason within the scope of professional medical practice.     We reviewed the side effects and precautions of opioids prescriptions as discussed in the opioids treatment agreement.    realizes the interaction between the therapeutic classes including the respiratory depression and potential death     Random drug testing   we will submit     Will continue with Norco or hydrocodone 5/325 3 times a day this is MEDD of 15.  Will continue with adjuvant therapy for constipation, nabumetone and trazodone to help with sleep.    Discussed about NSAIDS and I explained about the opioids sparing effect to allow keeping the opioids dose at minimal effective dose.   I went over the potential side effects of the NSAIDS on the gastrointestinal, renal and cardiovascular systems.      I detailed the side effects from the acetaminophen in the medication and made aware of those. I also explained about the cumulative effects on the organs and mainly the liver.     Given the opioids therapy , we discussed about the risk for accidental over dose on the pain medications, either for patient or other household. I went over the mechanism of action  and mode of use of the Naloxone according to the  recommendations. I will provide a prescription for a kit.     Follow-up 8 weeks or earlier if needed     The level of clinical decision making in this office visit,  is high, given the high risks of complications with the morbidity and mortality due to the fact that acute and chronic pain may pose a threat to life and bodily function, if under treated, poorly treated, or with failure to maintain adequate treatment and timely medical follow up. Additionally over treatment has its own set of complications including overdosing on the pain medications and also the habit forming potentials with the use of the medications used to treat chronic painful conditions including therapeutic classes classified as dangerous medications. Given the serious and fluctuating nature of pain level and instensity with extensive consideration for whenever pain changes, there is always the risk of prolonged functional impairment requiring close patient monitoring with regular assessments and reassessments and high level medical decision making at every office visit. The amount and complexity of data reviewed is high given the patient clinical presentation, labs,  data, radiology reports, and other tests as discussed during office visits. Pertinent data whether positive or negative were taken in consideration in the process of making this high level medical decision.

## 2025-06-26 ENCOUNTER — APPOINTMENT (OUTPATIENT)
Dept: PAIN MEDICINE | Facility: CLINIC | Age: 65
End: 2025-06-26
Payer: COMMERCIAL

## 2025-06-26 DIAGNOSIS — S46.011A RUPTURE OF RIGHT SUPRASPINATUS TENDON, INITIAL ENCOUNTER: ICD-10-CM

## 2025-06-26 DIAGNOSIS — M43.27 ANKYLOSIS OF LUMBOSACRAL JOINT: ICD-10-CM

## 2025-06-26 DIAGNOSIS — S31.000A OPEN TRAUMATIC SUBLUXATION OF PELVIS, INITIAL ENCOUNTER: ICD-10-CM

## 2025-06-26 DIAGNOSIS — S33.30XA OPEN TRAUMATIC SUBLUXATION OF PELVIS, INITIAL ENCOUNTER: ICD-10-CM

## 2025-06-26 DIAGNOSIS — S32.10XA CLOSED FRACTURE OF SACRUM, UNSPECIFIED PORTION OF SACRUM, INITIAL ENCOUNTER (MULTI): ICD-10-CM

## 2025-06-26 DIAGNOSIS — S22.41XA CLOSED FRACTURE OF FOUR RIBS OF RIGHT SIDE, INITIAL ENCOUNTER: ICD-10-CM

## 2025-06-26 DIAGNOSIS — M46.1 BILATERAL SACROILIITIS: ICD-10-CM

## 2025-06-26 PROCEDURE — 99214 OFFICE O/P EST MOD 30 MIN: CPT | Performed by: PHYSICAL MEDICINE & REHABILITATION

## 2025-06-26 RX ORDER — HYDROCODONE BITARTRATE AND ACETAMINOPHEN 5; 325 MG/1; MG/1
1 TABLET ORAL EVERY 8 HOURS PRN
Qty: 84 TABLET | Refills: 0 | Status: SHIPPED | OUTPATIENT
Start: 2025-07-03 | End: 2025-07-31

## 2025-06-26 RX ORDER — NABUMETONE 500 MG/1
500 TABLET, FILM COATED ORAL 2 TIMES DAILY
Qty: 60 TABLET | Refills: 1 | Status: SHIPPED | OUTPATIENT
Start: 2025-06-26 | End: 2026-06-26

## 2025-06-26 RX ORDER — LUBIPROSTONE 24 UG/1
24 CAPSULE ORAL 2 TIMES DAILY
Qty: 56 CAPSULE | Refills: 3 | Status: SHIPPED | OUTPATIENT
Start: 2025-06-26 | End: 2025-07-24

## 2025-06-26 RX ORDER — HYDROCODONE BITARTRATE AND ACETAMINOPHEN 5; 325 MG/1; MG/1
1 TABLET ORAL EVERY 8 HOURS PRN
Qty: 84 TABLET | Refills: 0 | Status: SHIPPED | OUTPATIENT
Start: 2025-07-31 | End: 2025-08-28

## 2025-06-26 RX ORDER — TRAZODONE HYDROCHLORIDE 50 MG/1
100 TABLET ORAL NIGHTLY
Qty: 56 TABLET | Refills: 1 | Status: SHIPPED | OUTPATIENT
Start: 2025-06-26 | End: 2025-07-24

## 2025-06-26 NOTE — PROGRESS NOTES
[facility] [id number]  DOI 1.18.2007      · Rupture of supraspinatus tendon (840.4) (S46.019A)   · Open traumatic subluxation of pelvis, initial encounter (839.79) (S33.30XA,S31.000A)   · Bilateral sacroiliitis (720.2) (M46.1)   · Ankylosis of lumbosacral joint (724.6) (M43.27)    Chief complaint  Lower back pain   R shoulder pain     Verenda E LPN,   was present during the entire history and physical examination    History  Jared Thomson is back for pain management office visit  The pain is controlled but is not gone.  He have the pain in the shoulder and the lower back.  He had acupuncture that helped him.  We could not get authorization for maintenance treatment for acupuncture.  He gets injection for episodes of aggravation.  Over the years has been able to cut back on the medication on a continuous basis  He is currently at a lower level on the treatment plateau    The pain is deep achy across the back area.  We discussed with him about cutting back on the pain medication does help with the chronic pain anxiety and depression    Pain level without medication is 8/10 , with the medication pain level 2-3/10.     Pain disability index (PDI) improvement   across different functional categories, with the pain control with the meds. Forms filled by patient are scanned in the chart    The pain meds are helping control the pain and improving Activities of Daily living and quality of life and quality of sleep.    opioids treatment agreement Jan 2025    Pill count today, using count tray, and in front of patient, Nurse and myself :  18.5    pills , last fill was on 6/5  for 84 tabs,  the meds pill count today is correct  Oarrs pulled and reviewed, no concerns  last urine toxicology testing was compliant this was done on : May 2025  Xray updated spine   ORT (opioid risk tool) score is  0  Pain pathology and pain generators spine   Modalities tried injection, surgery, physical therapy, TENS unit, nonsteroidal  "anti-inflammatory medication       Review of Systems :  Denied any fever or chills. No weight loss and no night sweats. No cough or sputum production. No diarrhea   The constipation has been responding to fibers and over the counter medications.     No bladder and bowel incontinence and no other changes in bladder and bowel. No skin changes.  Reports tiredness and fatigability only if the pain is not controlled.     I further Asked about symptoms or changes affecting the vision, hearing, breathing, digestive system, urinary symptoms, skin, other musculoskeletal condition, neurological conditions these are negative except as detailed in the history and physical examination above    Denied opioids diversion and abuse and denies alcoholism. Denies overuse of the pain medications.  No reported euphoria sensation or getting a \"high\" on the pain medications.    The control of the pain with the pain medications is helping the control of the symptoms and allowing the function and activities of daily living, enjoyment of life, improving the quality of life and sleep with less interruption by the pain. The goal is symptomatic control of the nonmalignant chronic pain and not to repair the permanent damage in the tissues inducing the chronic pain conditions. We are aiming to shift the focus from the nonmalignant chronic pain to other aspects of life by symptomatically treating this chronic pain. If this pain is not treated it will lead to major morbidity and it is also associated with increased risks of mortality. The patient understands those very clearly and also understand high risks of morbidity and mortality if not strictly adherent to the treatment recommendations and reporting any associated side effects. Also patient understand the full responsibility associated with these medications to avoid abuse or overuse or any use of these medications for anything besides treating the patient's own chronic pain and nothing else " under any circumstances.        Physical examination  Awake, alert and oriented for time place and persons   Pupils are equal and reactive to light and accommodation    Acute focal loading of the sacroiliac joint tight muscle bands in the lower back range of motion of the shoulder showed limited range of motion 220 degrees tight muscle bands around the shoulder blades    Diagnosis  Problem List Items Addressed This Visit       Ankylosis of lumbosacral joint    Relevant Medications    HYDROcodone-acetaminophen (Norco) 5-325 mg tablet (Start on 7/3/2025)    HYDROcodone-acetaminophen (Norco) 5-325 mg tablet (Start on 7/31/2025)    traZODone (Desyrel) 50 mg tablet    lubiprostone (Amitiza) 24 mcg capsule    nabumetone (Relafen) 500 mg tablet    Bilateral sacroiliitis    Relevant Medications    HYDROcodone-acetaminophen (Norco) 5-325 mg tablet (Start on 7/3/2025)    HYDROcodone-acetaminophen (Norco) 5-325 mg tablet (Start on 7/31/2025)    traZODone (Desyrel) 50 mg tablet    lubiprostone (Amitiza) 24 mcg capsule    nabumetone (Relafen) 500 mg tablet    Closed fracture of four ribs    Relevant Medications    HYDROcodone-acetaminophen (Norco) 5-325 mg tablet (Start on 7/3/2025)    HYDROcodone-acetaminophen (Norco) 5-325 mg tablet (Start on 7/31/2025)    traZODone (Desyrel) 50 mg tablet    lubiprostone (Amitiza) 24 mcg capsule    nabumetone (Relafen) 500 mg tablet    Closed fracture of sacrum (Multi)    Relevant Medications    HYDROcodone-acetaminophen (Norco) 5-325 mg tablet (Start on 7/3/2025)    HYDROcodone-acetaminophen (Norco) 5-325 mg tablet (Start on 7/31/2025)    traZODone (Desyrel) 50 mg tablet    lubiprostone (Amitiza) 24 mcg capsule    nabumetone (Relafen) 500 mg tablet    Open traumatic subluxation of pelvis    Relevant Medications    HYDROcodone-acetaminophen (Norco) 5-325 mg tablet (Start on 7/3/2025)    HYDROcodone-acetaminophen (Norco) 5-325 mg tablet (Start on 7/31/2025)    traZODone (Desyrel) 50 mg tablet     lubiprostone (Amitiza) 24 mcg capsule    nabumetone (Relafen) 500 mg tablet    Rupture of supraspinatus tendon    Relevant Medications    HYDROcodone-acetaminophen (Norco) 5-325 mg tablet (Start on 7/3/2025)    HYDROcodone-acetaminophen (Norco) 5-325 mg tablet (Start on 7/31/2025)    traZODone (Desyrel) 50 mg tablet    lubiprostone (Amitiza) 24 mcg capsule    nabumetone (Relafen) 500 mg tablet        Plan  Reviewed the pain generators.  Went over the types of pain with neuropathic and nociceptive and different pathologies and therapeutic modalities. Discussed the mechanism of action of interventions from acupuncture, physical therapy , regular exercises, injections, botox, spinal cord stimulation, and role of surgery     Went over pathology of the intervertebral disc displacement and the anatomical relation to the Nerve roots and relation to the radicular symptoms. Went over treatment modalities with conservative treatment including acupuncture   and epidural steroid injection with fluoroscopy guidance and last resort of surgery    Based on the above findings and the clinical response to the opioids medications and improvement of the activities of daily living, sleep, and work performance. We made this complex decision to continue the opioids therapy in light of the evidence of the patient's responsibility in using the pain medications as prescribed for the nonmalignant chronic pain condition. We discussed about the use of the pain medications to treat the symptoms of chronic nonmalignant pain and we are not trying the repair the permanent damage in the tissues, rather we are trying to control the symptoms induced by the permanent damage to the tissues inducing the chronic pain condition and resulting disability. I explained the difference and discussed it with the patient and stressed the importance of knowing the difference especially because of the potential side effects and the potential addicting effect and  habit forming nature of the dangerous drugs we are using to treat the symptoms of the chronic pain.      We discussed that we are prescribing the medications on good leo and legitimate medical reason within the scope of professional medical practice.     We reviewed the side effects and precautions of opioids prescriptions as discussed in the opioids treatment agreement.    realizes the interaction between the therapeutic classes including the respiratory depression and potential death     Random drug testing   we will submit         Discussed about NSAIDS and I explained about the opioids sparing effect to allow keeping the opioids dose at minimal effective dose.   I went over the potential side effects of the NSAIDS on the gastrointestinal, renal and cardiovascular systems.      I detailed the side effects from the acetaminophen in the medication and made aware of those. I also explained about the cumulative effects on the organs and mainly the liver.     Given the opioids therapy , we discussed about the risk for accidental over dose on the pain medications, either for patient or other household. I went over the mechanism of action and mode of use of the Naloxone according to the  recommendations. I will provide a prescription for a kit.     Focus of Today's Visit :  Continue with home exercise program consider repeat injection for aggravation.  Continue to try to cut back on the pain medication.      Follow-up 8 weeks or earlier if needed     The level of clinical decision making in this office visit,  is high, given the high risks of complications with the morbidity and mortality due to the fact that acute and chronic pain may pose a threat to life and bodily function, if under treated, poorly treated, or with failure to maintain adequate treatment and timely medical follow up. Additionally over treatment has its own set of complications including overdosing on the pain medications and also the  habit forming potentials with the use of the medications used to treat chronic painful conditions including therapeutic classes classified as dangerous medications. Given the serious and fluctuating nature of pain level and instensity with extensive consideration for whenever pain changes, there is always the risk of prolonged functional impairment requiring close patient monitoring with regular assessments and reassessments and high level medical decision making at every office visit. The amount and complexity of data reviewed is high given the patient clinical presentation, labs,  data, radiology reports, and other tests as discussed during office visits. Pertinent data whether positive or negative were taken in consideration in the process of making this high level medical decision.

## 2025-08-21 ENCOUNTER — APPOINTMENT (OUTPATIENT)
Dept: PAIN MEDICINE | Facility: CLINIC | Age: 65
End: 2025-08-21
Payer: COMMERCIAL

## 2025-08-21 DIAGNOSIS — S22.41XA CLOSED FRACTURE OF FOUR RIBS OF RIGHT SIDE, INITIAL ENCOUNTER: ICD-10-CM

## 2025-08-21 DIAGNOSIS — S31.000A OPEN TRAUMATIC SUBLUXATION OF PELVIS, INITIAL ENCOUNTER: ICD-10-CM

## 2025-08-21 DIAGNOSIS — M43.27 ANKYLOSIS OF LUMBOSACRAL JOINT: ICD-10-CM

## 2025-08-21 DIAGNOSIS — S46.011A RUPTURE OF RIGHT SUPRASPINATUS TENDON, INITIAL ENCOUNTER: ICD-10-CM

## 2025-08-21 DIAGNOSIS — M46.1 BILATERAL SACROILIITIS: ICD-10-CM

## 2025-08-21 DIAGNOSIS — S32.10XA CLOSED FRACTURE OF SACRUM, UNSPECIFIED PORTION OF SACRUM, INITIAL ENCOUNTER (MULTI): ICD-10-CM

## 2025-08-21 DIAGNOSIS — S33.30XA OPEN TRAUMATIC SUBLUXATION OF PELVIS, INITIAL ENCOUNTER: ICD-10-CM

## 2025-08-21 RX ORDER — TRAZODONE HYDROCHLORIDE 50 MG/1
100 TABLET ORAL NIGHTLY
Qty: 56 TABLET | Refills: 1 | Status: SHIPPED | OUTPATIENT
Start: 2025-08-21 | End: 2025-09-18

## 2025-08-21 RX ORDER — HYDROCODONE BITARTRATE AND ACETAMINOPHEN 5; 325 MG/1; MG/1
1 TABLET ORAL EVERY 8 HOURS PRN
Qty: 84 TABLET | Refills: 0 | Status: SHIPPED | OUTPATIENT
Start: 2025-09-29 | End: 2025-10-27

## 2025-08-21 RX ORDER — HYDROCODONE BITARTRATE AND ACETAMINOPHEN 5; 325 MG/1; MG/1
1 TABLET ORAL EVERY 8 HOURS PRN
Qty: 84 TABLET | Refills: 0 | Status: SHIPPED | OUTPATIENT
Start: 2025-09-01 | End: 2025-09-29

## 2025-08-21 RX ORDER — NABUMETONE 500 MG/1
500 TABLET, FILM COATED ORAL 2 TIMES DAILY
Qty: 60 TABLET | Refills: 1 | Status: SHIPPED | OUTPATIENT
Start: 2025-08-21 | End: 2026-08-21

## 2025-10-21 ENCOUNTER — APPOINTMENT (OUTPATIENT)
Dept: PAIN MEDICINE | Facility: CLINIC | Age: 65
End: 2025-10-21
Payer: COMMERCIAL